# Patient Record
Sex: MALE | Race: WHITE | NOT HISPANIC OR LATINO | Employment: OTHER | URBAN - METROPOLITAN AREA
[De-identification: names, ages, dates, MRNs, and addresses within clinical notes are randomized per-mention and may not be internally consistent; named-entity substitution may affect disease eponyms.]

---

## 2025-01-23 ENCOUNTER — HOSPITAL ENCOUNTER (OUTPATIENT)
Dept: RADIOLOGY | Facility: CLINIC | Age: 75
Discharge: HOME | End: 2025-01-23
Payer: MEDICARE

## 2025-01-23 DIAGNOSIS — M47.817 SPONDYLOSIS WITHOUT MYELOPATHY OR RADICULOPATHY, LUMBOSACRAL REGION: ICD-10-CM

## 2025-01-23 DIAGNOSIS — M46.1 SACROILIITIS, NOT ELSEWHERE CLASSIFIED (CMS-HCC): ICD-10-CM

## 2025-01-23 PROCEDURE — 72170 X-RAY EXAM OF PELVIS: CPT

## 2025-01-23 PROCEDURE — 72114 X-RAY EXAM L-S SPINE BENDING: CPT | Performed by: STUDENT IN AN ORGANIZED HEALTH CARE EDUCATION/TRAINING PROGRAM

## 2025-01-23 PROCEDURE — 72170 X-RAY EXAM OF PELVIS: CPT | Performed by: STUDENT IN AN ORGANIZED HEALTH CARE EDUCATION/TRAINING PROGRAM

## 2025-01-23 PROCEDURE — 72114 X-RAY EXAM L-S SPINE BENDING: CPT

## 2025-07-02 ENCOUNTER — APPOINTMENT (OUTPATIENT)
Dept: NEUROSURGERY | Facility: HOSPITAL | Age: 75
End: 2025-07-02
Payer: MEDICARE

## 2025-07-11 ENCOUNTER — HOSPITAL ENCOUNTER (INPATIENT)
Facility: HOSPITAL | Age: 75
DRG: 029 | End: 2025-07-11
Attending: EMERGENCY MEDICINE | Admitting: NURSE PRACTITIONER
Payer: MEDICARE

## 2025-07-11 ENCOUNTER — CLINICAL SUPPORT (OUTPATIENT)
Dept: EMERGENCY MEDICINE | Facility: HOSPITAL | Age: 75
DRG: 029 | End: 2025-07-11
Payer: MEDICARE

## 2025-07-11 ENCOUNTER — APPOINTMENT (OUTPATIENT)
Dept: RADIOLOGY | Facility: HOSPITAL | Age: 75
DRG: 029 | End: 2025-07-11
Payer: MEDICARE

## 2025-07-11 DIAGNOSIS — T85.192A SPINAL CORD STIMULATOR DYSFUNCTION, INITIAL ENCOUNTER: ICD-10-CM

## 2025-07-11 DIAGNOSIS — T84.498A EXPOSED ORTHOPAEDIC HARDWARE: ICD-10-CM

## 2025-07-11 DIAGNOSIS — C61 PROSTATE CANCER (MULTI): ICD-10-CM

## 2025-07-11 DIAGNOSIS — T81.30XA WOUND DEHISCENCE: ICD-10-CM

## 2025-07-11 DIAGNOSIS — F11.90 OPIOID USE: Primary | ICD-10-CM

## 2025-07-11 DIAGNOSIS — M16.0 BILATERAL PRIMARY OSTEOARTHRITIS OF HIP: ICD-10-CM

## 2025-07-11 PROBLEM — E11.9 TYPE 2 DIABETES MELLITUS WITHOUT COMPLICATION: Status: ACTIVE | Noted: 2024-08-25

## 2025-07-11 PROBLEM — Z96.82 PRESENCE OF NEUROSTIMULATOR: Status: ACTIVE | Noted: 2024-10-02

## 2025-07-11 PROBLEM — Z85.48: Status: ACTIVE | Noted: 2019-01-07

## 2025-07-11 LAB
ABO GROUP (TYPE) IN BLOOD: NORMAL
ANION GAP SERPL CALC-SCNC: 13 MMOL/L (ref 10–20)
ANTIBODY SCREEN: NORMAL
BASOPHILS # BLD AUTO: 0.08 X10*3/UL (ref 0–0.1)
BASOPHILS NFR BLD AUTO: 1 %
BUN SERPL-MCNC: 10 MG/DL (ref 6–23)
CALCIUM SERPL-MCNC: 9.7 MG/DL (ref 8.6–10.6)
CARDIAC TROPONIN I PNL SERPL HS: 7 NG/L (ref 0–53)
CHLORIDE SERPL-SCNC: 102 MMOL/L (ref 98–107)
CO2 SERPL-SCNC: 26 MMOL/L (ref 21–32)
CREAT SERPL-MCNC: 0.94 MG/DL (ref 0.5–1.3)
CRP SERPL-MCNC: 0.14 MG/DL
EGFRCR SERPLBLD CKD-EPI 2021: 85 ML/MIN/1.73M*2
EOSINOPHIL # BLD AUTO: 0.41 X10*3/UL (ref 0–0.4)
EOSINOPHIL NFR BLD AUTO: 5 %
ERYTHROCYTE [DISTWIDTH] IN BLOOD BY AUTOMATED COUNT: 12.2 % (ref 11.5–14.5)
ERYTHROCYTE [SEDIMENTATION RATE] IN BLOOD BY WESTERGREN METHOD: 10 MM/H (ref 0–20)
EST. AVERAGE GLUCOSE BLD GHB EST-MCNC: 183 MG/DL
GLUCOSE BLD MANUAL STRIP-MCNC: 105 MG/DL (ref 74–99)
GLUCOSE SERPL-MCNC: 96 MG/DL (ref 74–99)
HBA1C MFR BLD: 8 % (ref ?–5.7)
HCT VFR BLD AUTO: 43.5 % (ref 41–52)
HGB BLD-MCNC: 14.7 G/DL (ref 13.5–17.5)
IMM GRANULOCYTES # BLD AUTO: 0.03 X10*3/UL (ref 0–0.5)
IMM GRANULOCYTES NFR BLD AUTO: 0.4 % (ref 0–0.9)
INR PPP: 1 (ref 0.9–1.1)
LYMPHOCYTES # BLD AUTO: 1.84 X10*3/UL (ref 0.8–3)
LYMPHOCYTES NFR BLD AUTO: 22.4 %
MCH RBC QN AUTO: 29.5 PG (ref 26–34)
MCHC RBC AUTO-ENTMCNC: 33.8 G/DL (ref 32–36)
MCV RBC AUTO: 87 FL (ref 80–100)
MONOCYTES # BLD AUTO: 0.76 X10*3/UL (ref 0.05–0.8)
MONOCYTES NFR BLD AUTO: 9.3 %
NEUTROPHILS # BLD AUTO: 5.09 X10*3/UL (ref 1.6–5.5)
NEUTROPHILS NFR BLD AUTO: 61.9 %
NRBC BLD-RTO: 0 /100 WBCS (ref 0–0)
PLATELET # BLD AUTO: 214 X10*3/UL (ref 150–450)
POTASSIUM SERPL-SCNC: 4.3 MMOL/L (ref 3.5–5.3)
PROTHROMBIN TIME: 11.5 SECONDS (ref 9.8–12.4)
RBC # BLD AUTO: 4.98 X10*6/UL (ref 4.5–5.9)
RH FACTOR (ANTIGEN D): NORMAL
SODIUM SERPL-SCNC: 137 MMOL/L (ref 136–145)
WBC # BLD AUTO: 8.2 X10*3/UL (ref 4.4–11.3)

## 2025-07-11 PROCEDURE — 85025 COMPLETE CBC W/AUTO DIFF WBC: CPT | Performed by: EMERGENCY MEDICINE

## 2025-07-11 PROCEDURE — 93005 ELECTROCARDIOGRAM TRACING: CPT

## 2025-07-11 PROCEDURE — 36415 COLL VENOUS BLD VENIPUNCTURE: CPT | Performed by: EMERGENCY MEDICINE

## 2025-07-11 PROCEDURE — 86140 C-REACTIVE PROTEIN: CPT | Performed by: EMERGENCY MEDICINE

## 2025-07-11 PROCEDURE — 71045 X-RAY EXAM CHEST 1 VIEW: CPT

## 2025-07-11 PROCEDURE — 99285 EMERGENCY DEPT VISIT HI MDM: CPT | Performed by: EMERGENCY MEDICINE

## 2025-07-11 PROCEDURE — 1210000001 HC SEMI-PRIVATE ROOM DAILY

## 2025-07-11 PROCEDURE — 86900 BLOOD TYPING SEROLOGIC ABO: CPT | Performed by: EMERGENCY MEDICINE

## 2025-07-11 PROCEDURE — 72072 X-RAY EXAM THORAC SPINE 3VWS: CPT | Performed by: RADIOLOGY

## 2025-07-11 PROCEDURE — 0PB40ZZ EXCISION OF THORACIC VERTEBRA, OPEN APPROACH: ICD-10-PCS | Performed by: STUDENT IN AN ORGANIZED HEALTH CARE EDUCATION/TRAINING PROGRAM

## 2025-07-11 PROCEDURE — 83036 HEMOGLOBIN GLYCOSYLATED A1C: CPT | Performed by: NURSE PRACTITIONER

## 2025-07-11 PROCEDURE — 71045 X-RAY EXAM CHEST 1 VIEW: CPT | Performed by: RADIOLOGY

## 2025-07-11 PROCEDURE — 85610 PROTHROMBIN TIME: CPT | Performed by: EMERGENCY MEDICINE

## 2025-07-11 PROCEDURE — 84484 ASSAY OF TROPONIN QUANT: CPT | Performed by: EMERGENCY MEDICINE

## 2025-07-11 PROCEDURE — 72072 X-RAY EXAM THORAC SPINE 3VWS: CPT

## 2025-07-11 PROCEDURE — 82947 ASSAY GLUCOSE BLOOD QUANT: CPT

## 2025-07-11 PROCEDURE — 00PV0MZ REMOVAL OF NEUROSTIMULATOR LEAD FROM SPINAL CORD, OPEN APPROACH: ICD-10-PCS | Performed by: STUDENT IN AN ORGANIZED HEALTH CARE EDUCATION/TRAINING PROGRAM

## 2025-07-11 PROCEDURE — 0JB70ZZ EXCISION OF BACK SUBCUTANEOUS TISSUE AND FASCIA, OPEN APPROACH: ICD-10-PCS | Performed by: STUDENT IN AN ORGANIZED HEALTH CARE EDUCATION/TRAINING PROGRAM

## 2025-07-11 PROCEDURE — 99222 1ST HOSP IP/OBS MODERATE 55: CPT

## 2025-07-11 PROCEDURE — 72170 X-RAY EXAM OF PELVIS: CPT | Performed by: RADIOLOGY

## 2025-07-11 PROCEDURE — 99223 1ST HOSP IP/OBS HIGH 75: CPT | Performed by: NURSE PRACTITIONER

## 2025-07-11 PROCEDURE — 72100 X-RAY EXAM L-S SPINE 2/3 VWS: CPT | Performed by: RADIOLOGY

## 2025-07-11 PROCEDURE — 80048 BASIC METABOLIC PNL TOTAL CA: CPT | Performed by: EMERGENCY MEDICINE

## 2025-07-11 PROCEDURE — 72170 X-RAY EXAM OF PELVIS: CPT

## 2025-07-11 PROCEDURE — 0JPT0MZ REMOVAL OF STIMULATOR GENERATOR FROM TRUNK SUBCUTANEOUS TISSUE AND FASCIA, OPEN APPROACH: ICD-10-PCS | Performed by: STUDENT IN AN ORGANIZED HEALTH CARE EDUCATION/TRAINING PROGRAM

## 2025-07-11 PROCEDURE — 85652 RBC SED RATE AUTOMATED: CPT | Performed by: EMERGENCY MEDICINE

## 2025-07-11 PROCEDURE — 72100 X-RAY EXAM L-S SPINE 2/3 VWS: CPT

## 2025-07-11 RX ORDER — INSULIN GLARGINE 100 [IU]/ML
10 INJECTION, SOLUTION SUBCUTANEOUS NIGHTLY
Status: DISCONTINUED | OUTPATIENT
Start: 2025-07-11 | End: 2025-07-11

## 2025-07-11 RX ORDER — ENOXAPARIN SODIUM 100 MG/ML
40 INJECTION SUBCUTANEOUS EVERY 24 HOURS
Status: DISCONTINUED | OUTPATIENT
Start: 2025-07-12 | End: 2025-07-17 | Stop reason: HOSPADM

## 2025-07-11 RX ORDER — PROMETHAZINE HYDROCHLORIDE 25 MG/1
25 TABLET ORAL EVERY 6 HOURS PRN
COMMUNITY

## 2025-07-11 RX ORDER — PEN NEEDLE, DIABETIC 31 GX5/16"
NEEDLE, DISPOSABLE MISCELLANEOUS
COMMUNITY
Start: 2024-12-23

## 2025-07-11 RX ORDER — HYDROCODONE BITARTRATE AND ACETAMINOPHEN 5; 325 MG/1; MG/1
1 TABLET ORAL EVERY 6 HOURS PRN
Refills: 0 | Status: DISCONTINUED | OUTPATIENT
Start: 2025-07-11 | End: 2025-07-11

## 2025-07-11 RX ORDER — HYDROCODONE BITARTRATE AND ACETAMINOPHEN 5; 325 MG/1; MG/1
1 TABLET ORAL EVERY 6 HOURS PRN
Status: DISCONTINUED | OUTPATIENT
Start: 2025-07-11 | End: 2025-07-11

## 2025-07-11 RX ORDER — INSULIN GLARGINE 100 [IU]/ML
38 INJECTION, SOLUTION SUBCUTANEOUS EVERY MORNING
COMMUNITY
Start: 2025-07-01

## 2025-07-11 RX ORDER — DEXTROSE 50 % IN WATER (D50W) INTRAVENOUS SYRINGE
12.5
Status: DISCONTINUED | OUTPATIENT
Start: 2025-07-11 | End: 2025-07-17 | Stop reason: HOSPADM

## 2025-07-11 RX ORDER — DEXTROSE 50 % IN WATER (D50W) INTRAVENOUS SYRINGE
25
Status: DISCONTINUED | OUTPATIENT
Start: 2025-07-11 | End: 2025-07-17 | Stop reason: HOSPADM

## 2025-07-11 RX ORDER — PROCHLORPERAZINE EDISYLATE 5 MG/ML
5 INJECTION INTRAMUSCULAR; INTRAVENOUS EVERY 6 HOURS PRN
Status: DISCONTINUED | OUTPATIENT
Start: 2025-07-11 | End: 2025-07-17 | Stop reason: HOSPADM

## 2025-07-11 RX ORDER — AMOXICILLIN 250 MG
1 CAPSULE ORAL 2 TIMES DAILY
Status: DISCONTINUED | OUTPATIENT
Start: 2025-07-12 | End: 2025-07-17 | Stop reason: HOSPADM

## 2025-07-11 RX ORDER — INSULIN GLARGINE 100 [IU]/ML
10 INJECTION, SOLUTION SUBCUTANEOUS
Status: DISCONTINUED | OUTPATIENT
Start: 2025-07-12 | End: 2025-07-17 | Stop reason: HOSPADM

## 2025-07-11 RX ORDER — HYDROCODONE BITARTRATE AND ACETAMINOPHEN 5; 325 MG/1; MG/1
1 TABLET ORAL EVERY 8 HOURS PRN
Refills: 0 | Status: DISCONTINUED | OUTPATIENT
Start: 2025-07-11 | End: 2025-07-16

## 2025-07-11 RX ORDER — HYDROCODONE BITARTRATE AND ACETAMINOPHEN 5; 325 MG/1; MG/1
1 TABLET ORAL EVERY 8 HOURS PRN
COMMUNITY
Start: 2025-06-15

## 2025-07-11 RX ORDER — HYDRALAZINE HYDROCHLORIDE 25 MG/1
25 TABLET, FILM COATED ORAL 3 TIMES DAILY PRN
Status: DISCONTINUED | OUTPATIENT
Start: 2025-07-11 | End: 2025-07-17 | Stop reason: HOSPADM

## 2025-07-11 RX ORDER — METFORMIN HYDROCHLORIDE 500 MG/1
1 TABLET, EXTENDED RELEASE ORAL
COMMUNITY
Start: 2025-06-05

## 2025-07-11 RX ORDER — INSULIN LISPRO 100 [IU]/ML
0-10 INJECTION, SOLUTION INTRAVENOUS; SUBCUTANEOUS
Status: DISCONTINUED | OUTPATIENT
Start: 2025-07-12 | End: 2025-07-17 | Stop reason: HOSPADM

## 2025-07-11 RX ORDER — INSULIN GLARGINE 100 [IU]/ML
20 INJECTION, SOLUTION SUBCUTANEOUS NIGHTLY
Status: DISCONTINUED | OUTPATIENT
Start: 2025-07-11 | End: 2025-07-11

## 2025-07-11 RX ORDER — MORPHINE SULFATE 4 MG/ML
2 INJECTION INTRAVENOUS EVERY 4 HOURS PRN
Status: ACTIVE | OUTPATIENT
Start: 2025-07-11 | End: 2025-07-12

## 2025-07-11 RX ORDER — ACETAMINOPHEN 500 MG
5 TABLET ORAL NIGHTLY PRN
Status: DISCONTINUED | OUTPATIENT
Start: 2025-07-11 | End: 2025-07-17 | Stop reason: HOSPADM

## 2025-07-11 ASSESSMENT — LIFESTYLE VARIABLES
HAVE PEOPLE ANNOYED YOU BY CRITICIZING YOUR DRINKING: NO
HAVE YOU EVER FELT YOU SHOULD CUT DOWN ON YOUR DRINKING: NO
EVER HAD A DRINK FIRST THING IN THE MORNING TO STEADY YOUR NERVES TO GET RID OF A HANGOVER: NO
EVER FELT BAD OR GUILTY ABOUT YOUR DRINKING: NO
TOTAL SCORE: 0

## 2025-07-11 ASSESSMENT — ENCOUNTER SYMPTOMS
VOMITING: 0
BACK PAIN: 1
CONFUSION: 0
DIARRHEA: 0
FEVER: 0
COUGH: 0
DIZZINESS: 0
HEADACHES: 1
SHORTNESS OF BREATH: 0
CHILLS: 0
DYSURIA: 0
WEAKNESS: 0
WOUND: 1
DIFFICULTY URINATING: 0
NAUSEA: 1
ABDOMINAL PAIN: 0

## 2025-07-11 ASSESSMENT — PAIN DESCRIPTION - LOCATION: LOCATION: BACK

## 2025-07-11 ASSESSMENT — PAIN DESCRIPTION - PAIN TYPE: TYPE: ACUTE PAIN

## 2025-07-11 ASSESSMENT — COGNITIVE AND FUNCTIONAL STATUS - GENERAL
CLIMB 3 TO 5 STEPS WITH RAILING: A LITTLE
DAILY ACTIVITIY SCORE: 24
MOBILITY SCORE: 23

## 2025-07-11 ASSESSMENT — PAIN SCALES - GENERAL
PAINLEVEL_OUTOF10: 5 - MODERATE PAIN
PAINLEVEL_OUTOF10: 2

## 2025-07-11 ASSESSMENT — PAIN - FUNCTIONAL ASSESSMENT
PAIN_FUNCTIONAL_ASSESSMENT: 0-10
PAIN_FUNCTIONAL_ASSESSMENT: 0-10

## 2025-07-11 NOTE — ED PROVIDER NOTES
"  Emergency Department Provider Note       History of Present Illness     History provided by: Patient  Limitations to History: None  External Records Reviewed with Brief Summary: {ED External Records Reviewed with Brief Summary:27992}    HPI:  Kevon Martinez is a 75 y.o. male with T2DM and history of prostate cancer who presents with a chief complaint of back pain and exposed surgical implant. Patient had a nerve stimulator placed in left hip in , which his wife noticed was exposed 3 days ago. Upon examination, there is a 1\" opening in his posterior left hip with part of the nerve stimulator visible. He is normally followed by doctors at [facility]. Patient denies fever, productive cough, chest pain, abdominal pain, diarrhea, constipation, dysuria.     Physical Exam   Triage vitals:  T 36.8 °C (98.2 °F)  HR 77  BP (!) 179/98  RR 16  O2 97 % None (Room air)    General: Awake, alert, in no acute distress  Eyes: Gaze conjugate.  No scleral icterus or injection  HENT: Normo-cephalic, atraumatic. No stridor  CV: {HR:06226::\"Regular\"} rate, {rhythm:18155::\"regular\"} rhythm. Radial pulses 2+ bilaterally  Resp: Breathing non-labored, speaking in full sentences.  Clear to auscultation bilaterally  GI: Soft, non-distended, non-tender. No rebound or guarding.  MSK/Extremities: No gross bony deformities. Moving all extremities. Endorses back pain on spine palpation.   Skin: Warm. Appropriate color  Neuro: Alert. Oriented. Face symmetric. Speech is fluent.  Gross strength and sensation intact in b/l UE and LEs  Psych: Appropriate mood and affect      Medical Decision Making & ED Course   Medical Decision Makin y.o. male with exposed surgical implant. Ortho/spine was consulted, and plan was made to explant nerve stimulator. Patient was admitted to Medicine prior to OR. Patient was NPO, and pre-op labs and tests ordered.   ----  {Scoring Tools Utilized:66443}    Differential diagnoses considered include " "but are not limited to: ***    Social Determinants of Health which Significantly Impact Care: {Social Determinants of Health which Significantly Impact Care:28344} {The following actions were taken to address these social determinants (Optional):66586}    EKG Independent Interpretation: {EKG Independent Interpretation:01270}    Independent Result Review and Interpretation: {Independent Result Review and Interpretation:01980::\"Relevant laboratory and radiographic results were reviewed and independently interpreted by myself.  As necessary, they are commented on in the ED Course.\"}    Chronic conditions affecting the patient's care: {Chronic conditions affecting the patient's care:67200::\"As documented above in MDM\"}    The patient was discussed with the following consultants/services: {The patient was discussed with the following consultants/services:56466}    Care Considerations: {Care Considerations:08483::\"As documented above in MDM\"}    ED Course:  Diagnoses as of 07/11/25 1903   Wound dehiscence       Disposition   {ED Disposition:81561}  As a result of workup, the patient will require admission to the hospital, followed by surgery by Ortho/spine.  The patient was informed of this.  The patient was given the opportunity to ask questions and I answered them. The patient agreed to be admitted to the hospital.      Procedures   Procedures    {ED Provider Level (Optional):55357}    [facility]  Emergency Medicine                                                    " erosion over spine stimulator inplant as above  No acute issues  Preop labs and EKG/imaging reviewed  Will admit to medicine for preop optimization       MD Tita Ruelas MD  07/22/25 4933       Tita Camara MD  07/22/25 7898

## 2025-07-11 NOTE — H&P
History Of Present Illness  Kevon Martinez is a 75 y.o. male with a PMH of T2DM (last HgA1c 8.0% 7/11/25), OA, IKER, HLD, GERD, prostate cancer, HTN, and sacroiliitis. Pt presented to ED as transfer from OSH for further evaluation of exposed nerve stimulator. Pt reports that his wife noticed that wound has dehisced 4 days prior to ED presentation and that his nerve stimulator was exposed. Per pt there was clear drainage from wound. He denies experiencing fever or chills. Pt c/o nausea and headache from not eating when seen by writer in ED bed 43. Pt reports that he has not ate since 10a on 7/10. Back pain on exam was 9/10. Labs obtained as part of ED workup unremarkable. Pt afebrile on arrival to ED. CT T/L spine and Xray imaging of L spine & pelvis pending completion at time of admit. Ortho spine consulted and plan is for intervention with consulting team. Pt afebrile on arrival to ED. Pt admitted to medicine service due to wound dehiscence at nerve stimulator site.    ED interventions: none     Past Medical History  Medical History[1]    Surgical History  Surgical History[2]     Social History  He has no history on file for tobacco use, alcohol use, and drug use.    Family History  Family History[3]     Allergies  Zolpidem, Adhesive, Penicillins, Atorvastatin, Dapagliflozin, Fenofibrate, Meloxicam, Metformin, and Prednisone    Review of Systems   Constitutional:  Negative for chills and fever.   HENT:  Negative for congestion.    Respiratory:  Negative for cough and shortness of breath.    Cardiovascular:  Negative for chest pain.   Gastrointestinal:  Positive for nausea. Negative for abdominal pain, diarrhea and vomiting.   Genitourinary:  Negative for difficulty urinating and dysuria.   Musculoskeletal:  Positive for back pain.   Skin:  Positive for wound.   Neurological:  Positive for headaches. Negative for dizziness and weakness.   Psychiatric/Behavioral:  Negative for confusion.         Physical  "Exam  Vitals reviewed.   Constitutional:       General: He is not in acute distress.  HENT:      Head: Normocephalic.      Nose: Nose normal.      Mouth/Throat:      Mouth: Mucous membranes are dry.   Eyes:      Extraocular Movements: Extraocular movements intact.   Cardiovascular:      Rate and Rhythm: Normal rate.      Pulses: Normal pulses.      Heart sounds: Normal heart sounds.   Pulmonary:      Effort: Pulmonary effort is normal. No respiratory distress.      Breath sounds: Normal breath sounds.   Abdominal:      General: Bowel sounds are normal.      Palpations: Abdomen is soft.   Musculoskeletal:      Cervical back: Normal range of motion.      Right lower leg: No edema.      Left lower leg: No edema.   Skin:     General: Skin is warm.      Comments: Left lower back wound (drsg dry & intact)   Neurological:      Mental Status: He is alert and oriented to person, place, and time.          Last Recorded Vitals  Blood pressure 177/88, pulse 78, temperature 36.8 °C (98.2 °F), resp. rate 16, height 1.752 m (5' 8.98\"), weight 79.4 kg (175 lb), SpO2 98%.    Relevant Results    Results for orders placed or performed during the hospital encounter of 07/11/25 (from the past 24 hours)   CBC and Auto Differential   Result Value Ref Range    WBC 8.2 4.4 - 11.3 x10*3/uL    nRBC 0.0 0.0 - 0.0 /100 WBCs    RBC 4.98 4.50 - 5.90 x10*6/uL    Hemoglobin 14.7 13.5 - 17.5 g/dL    Hematocrit 43.5 41.0 - 52.0 %    MCV 87 80 - 100 fL    MCH 29.5 26.0 - 34.0 pg    MCHC 33.8 32.0 - 36.0 g/dL    RDW 12.2 11.5 - 14.5 %    Platelets 214 150 - 450 x10*3/uL    Neutrophils % 61.9 40.0 - 80.0 %    Immature Granulocytes %, Automated 0.4 0.0 - 0.9 %    Lymphocytes % 22.4 13.0 - 44.0 %    Monocytes % 9.3 2.0 - 10.0 %    Eosinophils % 5.0 0.0 - 6.0 %    Basophils % 1.0 0.0 - 2.0 %    Neutrophils Absolute 5.09 1.60 - 5.50 x10*3/uL    Immature Granulocytes Absolute, Automated 0.03 0.00 - 0.50 x10*3/uL    Lymphocytes Absolute 1.84 0.80 - 3.00 " x10*3/uL    Monocytes Absolute 0.76 0.05 - 0.80 x10*3/uL    Eosinophils Absolute 0.41 (H) 0.00 - 0.40 x10*3/uL    Basophils Absolute 0.08 0.00 - 0.10 x10*3/uL   Basic metabolic panel   Result Value Ref Range    Glucose 96 74 - 99 mg/dL    Sodium 137 136 - 145 mmol/L    Potassium 4.3 3.5 - 5.3 mmol/L    Chloride 102 98 - 107 mmol/L    Bicarbonate 26 21 - 32 mmol/L    Anion Gap 13 10 - 20 mmol/L    Urea Nitrogen 10 6 - 23 mg/dL    Creatinine 0.94 0.50 - 1.30 mg/dL    eGFR 85 >60 mL/min/1.73m*2    Calcium 9.7 8.6 - 10.6 mg/dL   Protime-INR   Result Value Ref Range    Protime 11.5 9.8 - 12.4 seconds    INR 1.0 0.9 - 1.1   Troponin I, High Sensitivity   Result Value Ref Range    Troponin I, High Sensitivity (CMC) 7 0 - 53 ng/L   Type And Screen   Result Value Ref Range    ABO TYPE A     Rh TYPE POS     ANTIBODY SCREEN NEG    Sedimentation rate, automated   Result Value Ref Range    Sedimentation Rate 10 0 - 20 mm/h   C-reactive protein   Result Value Ref Range    C-Reactive Protein 0.14 <1.00 mg/dL   Hemoglobin A1c   Result Value Ref Range    Hemoglobin A1C 8.0 (H) See comment %    Estimated Average Glucose 183 Not Established mg/dL      Lab Results   Component Value Date    HGBA1C 8.0 (H) 07/11/2025        Scheduled medications  Scheduled Medications[4]  Continuous medications  Continuous Medications[5]  PRN medications  PRN Medications[6]       Assessment and Plan   Mr. Martinez is a 75 year old male with a PMH of T2DM (last HgA1c 8.05 7/11/25), OA, IKER, HLD, GERD, prostate cancer, HTN, and sacroiliitis. Pt presented to ED as transfer from OSH for further evaluation of exposed nerve stimulator. Labs obtained as part of ED workup unremarkable. Pt afebrile on arrival to ED. CT T/L spine and Xray imaging of L spine & pelvis pending completion at time of admit. Ortho spine consulted and plan is for intervention with consulting team. Pt afebrile on arrival to ED. Pt admitted to medicine service due to wound dehiscence at  nerve stimulator site.    Wound dehiscence with exposed nerve stimulator  - ortho spine consulted, appreciate recs  - Pre-op labs/tests ordered: CBC, BMP, PT/INR, T&S, EKG, CXR, CT L spine, CT T spine, Xray L spine, Xray pelvis  - NPO except meds with sips of water  - holding chemo ppx d/t upcoming surgery  - Surgical Risk Assessment: Patient doesn’t have contraindications to elective surgery. Surgery-Specific Cardiac Risk: Low risk (<1% ). Patient-Specific Cardiac Risk:  RCRI 1 points class II risk. This also corresponds to a low-risk of major perioperative cardiovascular events. The procedure, alternative treatments, risks, and benefits were explained to the patient. The risk of MACE in the perioperative period was explained to the patient. Troponins were negative and EKG nonischemic. No evidence of coagulopathy on coagulation panel. This patient is low risk for a low risk procedure and is at this time medically optimized for surgical intervention.   - Pain regimen: c/w home dose of Norco 1 tab q8h PRN moderate pain and Morphine 2 mg q4h PRN severe pain (x24 hrs)    T2DM (last HgA1c 8.0% 7/11/25)  - home regimen: Metformin  mg PO TID and Lantus 38 units every morning  - holding home dose of Metformin will resume on discharge  - Lispro SSI 0-10 units ordered  - Lantus 10 units at every morning (can titrate dose as needed, lower dose ordered to prevent episodes of hypoglycemia)  - accucheck ACHS    HTN  - no meds  - BP on admit 177/88  - continue to monitor BP  - Hydralazine 25 mg PO TID PRN SBP>180/DBP>100    Sacroiliitis  - home regimen: Norco 1 tab PO q8h PRN mod-severe pain. Pt may take additional dose 10 days out of month (last filled via [facility] 6/15/25 100 tabs/30 days)  - Norco 1 tab PO q8h PRN mod pain    Dispo: admit to [facility]. Plan per above. Estimated LOS: 2-3 days    I spent 75 minutes in the professional and overall care of this patient.      Chrissy Lam, APRN-CNP         [1] No past medical  history on file.  [2]   Past Surgical History:  Procedure Laterality Date    OTHER SURGICAL HISTORY  05/17/2022    Ankle fracture repair   [3] No family history on file.  [4] [Held by provider] enoxaparin, 40 mg, subcutaneous, q24h  [START ON 7/12/2025] insulin glargine, 10 Units, subcutaneous, Daily before breakfast  [START ON 7/12/2025] insulin lispro, 0-10 Units, subcutaneous, TID AC  [START ON 7/12/2025] sennosides-docusate sodium, 1 tablet, oral, BID     [5]    [6] PRN medications: dextrose, dextrose, glucagon, glucagon, hydrALAZINE, HYDROcodone-acetaminophen, melatonin, morphine, prochlorperazine

## 2025-07-11 NOTE — ED TRIAGE NOTES
Patient presented to the ed from [facility] with complaints of nerve stimulator complaints,  patient has had a nerve stimulator since 2016 and recently had a battery replacment for it in November.   He says that he has had a small opening in the incision site that now appears that the stimulator is migrating out of the wound site.  He was sent here for evaluation and consutation by neuro and surgery.

## 2025-07-11 NOTE — CONSULTS
Orthopaedic Surgery Consult H&P    HPI:   Orthopaedic Problems/Injuries: Dehiscence of spinal stimulator   Other Injuries: none    75 y.o. male (DM, prostate cancer, s/p nerve stimulator in 2014) transferred from OSH for exposed spinal cord stimulator for 3days. No fevers or chills. Patient denies bowel/bladder incontinence. No dense numbness or weakness.    PMH: per above/EMR  PSH: per above/EMR  SocHx: EMR reviewed  FamHx:  Non-contributory to this patient's acute orthopaedic problem.   Allergies: Reviewed in EMR  Meds: Reviewed in EMR    ROS      - 14 point ROS negative except as above    Physical Exam:  Gen: AOx3, NAD  HEENT: normocephalic atraumatic  Psych: appropriate mood and affect  Resp: nonlabored breathing  Cardiac: Extremities WWP, RRR to peripheral palpation  Neuro: alert and oriented   Skin: Dehiscence of incision on left lateral aspect of lower back without erythema or drainage; Well healed incision along spine    Spine Exam:    C5: SILT   Deltoid 5/5 Left; 5/5 Right  C6: SILT   Wrist Ext: 5/5 Left; 5/5 Right  C7: SILT   Triceps: 5/5 Left; 5/5 Right  C8: SILT   Finger flexion: 5/5 Left; 5/5 Right  T1: SILT    Interossei: 5/5 Left; 5/5 Right    Lezama: Negative    L1: SILT       L2: SILT      Hip flexors 5/5 Left; 5/5 Right  L3: SILT      Knee extension 5/5 Left; 5/5 Right  L4: SILT      Tib Ant. (Dorsiflexion) 5/5 Left; 5/5 Right  L5: SILT      EHL 5/5 Left; 5/5 Right  S1: SILT      Plantarflexion 5/5 Left; 5/5 Right    Babinkski: Intact  No clonus    Imaging:  XR Lumbar spine with Posterior fusion L1-L2 without hardware failure.    Assessment:  Orthopaedic Problems/Injuries: Dehiscence of spinal stimulator     75M (DM, prostate cancer, s/p nerve stimulator in 2014) w exposed spinal cord stimulator for 3d. Denies any fever, chills, B/B issues. 5/5 BLUE and BLE. SILT. Afebrile. WBC 8.2. CRP 0.14. ESR 10. CT T/L spine pending. T spine uprights. Plan for OR with Dr. Fountain for I&D and wound closure,  possible device revision early next week (7/14-15)    Plan:  - Admit to medicine, clearance pending for OR; Appreciate documentation of clearance by primary team  - Please obtain pre-operative labs/studies: T&S, PT/INR, CBC, BMP, CXR, EKG  - CT T/L Spine (ordered)  - Uprights T spine XR (ordered)  - Ok for diet from orthopedic perspective at this time  - WBAT  - No indication for transfusion pre-operatively  - DVT PPx: SCDs, okay for chemoppx per primary    Consult seen and staffed within 30 minutes of notification.    This consult was staffed with attending physician, Dr. Fountain.    Jerrell Edwards MD  PGY-1 Orthopaedic Surgery  On-call Resident  _________________________________________________________    This patient will be followed by the Ortho Spine Team while inpatient. See team members and contacts below:    For urgent matters at any time, or for any needs between 6 PM and 6 AM Monday through Friday, on weekends, or on holidays, please page the Orthopaedic Surgery resident on call at [phone].    Spine:  First Call: Darian Leal, PGY-2  Second Call: Arpita Pool, PGY-4

## 2025-07-12 ENCOUNTER — APPOINTMENT (OUTPATIENT)
Dept: RADIOLOGY | Facility: HOSPITAL | Age: 75
DRG: 029 | End: 2025-07-12
Payer: MEDICARE

## 2025-07-12 LAB
ALBUMIN SERPL BCP-MCNC: 4 G/DL (ref 3.4–5)
ANION GAP SERPL CALC-SCNC: 12 MMOL/L (ref 10–20)
ATRIAL RATE: 62 BPM
BASOPHILS # BLD AUTO: 0.08 X10*3/UL (ref 0–0.1)
BASOPHILS NFR BLD AUTO: 1.1 %
BUN SERPL-MCNC: 12 MG/DL (ref 6–23)
CALCIUM SERPL-MCNC: 9.4 MG/DL (ref 8.6–10.6)
CHLORIDE SERPL-SCNC: 102 MMOL/L (ref 98–107)
CO2 SERPL-SCNC: 27 MMOL/L (ref 21–32)
CREAT SERPL-MCNC: 1.03 MG/DL (ref 0.5–1.3)
EGFRCR SERPLBLD CKD-EPI 2021: 76 ML/MIN/1.73M*2
EOSINOPHIL # BLD AUTO: 0.46 X10*3/UL (ref 0–0.4)
EOSINOPHIL NFR BLD AUTO: 6.4 %
ERYTHROCYTE [DISTWIDTH] IN BLOOD BY AUTOMATED COUNT: 12.3 % (ref 11.5–14.5)
GLUCOSE BLD MANUAL STRIP-MCNC: 134 MG/DL (ref 74–99)
GLUCOSE BLD MANUAL STRIP-MCNC: 155 MG/DL (ref 74–99)
GLUCOSE BLD MANUAL STRIP-MCNC: 162 MG/DL (ref 74–99)
GLUCOSE BLD MANUAL STRIP-MCNC: 174 MG/DL (ref 74–99)
GLUCOSE BLD MANUAL STRIP-MCNC: 224 MG/DL (ref 74–99)
GLUCOSE SERPL-MCNC: 121 MG/DL (ref 74–99)
HCT VFR BLD AUTO: 43.1 % (ref 41–52)
HGB BLD-MCNC: 14 G/DL (ref 13.5–17.5)
IMM GRANULOCYTES # BLD AUTO: 0.02 X10*3/UL (ref 0–0.5)
IMM GRANULOCYTES NFR BLD AUTO: 0.3 % (ref 0–0.9)
INR PPP: 0.9 (ref 0.9–1.1)
LYMPHOCYTES # BLD AUTO: 1.88 X10*3/UL (ref 0.8–3)
LYMPHOCYTES NFR BLD AUTO: 26.2 %
MAGNESIUM SERPL-MCNC: 2.37 MG/DL (ref 1.6–2.4)
MCH RBC QN AUTO: 29.8 PG (ref 26–34)
MCHC RBC AUTO-ENTMCNC: 32.5 G/DL (ref 32–36)
MCV RBC AUTO: 92 FL (ref 80–100)
MONOCYTES # BLD AUTO: 0.88 X10*3/UL (ref 0.05–0.8)
MONOCYTES NFR BLD AUTO: 12.3 %
NEUTROPHILS # BLD AUTO: 3.86 X10*3/UL (ref 1.6–5.5)
NEUTROPHILS NFR BLD AUTO: 53.7 %
NRBC BLD-RTO: 0 /100 WBCS (ref 0–0)
P AXIS: 35 DEGREES
P OFFSET: 188 MS
P ONSET: 133 MS
PHOSPHATE SERPL-MCNC: 4.2 MG/DL (ref 2.5–4.9)
PLATELET # BLD AUTO: 216 X10*3/UL (ref 150–450)
POTASSIUM SERPL-SCNC: 3.9 MMOL/L (ref 3.5–5.3)
PR INTERVAL: 180 MS
PROTHROMBIN TIME: 10.4 SECONDS (ref 9.8–12.4)
Q ONSET: 223 MS
QRS COUNT: 10 BEATS
QRS DURATION: 86 MS
QT INTERVAL: 414 MS
QTC CALCULATION(BAZETT): 420 MS
QTC FREDERICIA: 418 MS
R AXIS: -1 DEGREES
RBC # BLD AUTO: 4.7 X10*6/UL (ref 4.5–5.9)
SODIUM SERPL-SCNC: 137 MMOL/L (ref 136–145)
T AXIS: 32 DEGREES
T OFFSET: 430 MS
VENTRICULAR RATE: 62 BPM
WBC # BLD AUTO: 7.2 X10*3/UL (ref 4.4–11.3)

## 2025-07-12 PROCEDURE — 36415 COLL VENOUS BLD VENIPUNCTURE: CPT | Performed by: NURSE PRACTITIONER

## 2025-07-12 PROCEDURE — 2500000001 HC RX 250 WO HCPCS SELF ADMINISTERED DRUGS (ALT 637 FOR MEDICARE OP): Performed by: NURSE PRACTITIONER

## 2025-07-12 PROCEDURE — 84100 ASSAY OF PHOSPHORUS: CPT | Performed by: NURSE PRACTITIONER

## 2025-07-12 PROCEDURE — 72131 CT LUMBAR SPINE W/O DYE: CPT | Performed by: RADIOLOGY

## 2025-07-12 PROCEDURE — 72131 CT LUMBAR SPINE W/O DYE: CPT

## 2025-07-12 PROCEDURE — 1210000001 HC SEMI-PRIVATE ROOM DAILY

## 2025-07-12 PROCEDURE — 99233 SBSQ HOSP IP/OBS HIGH 50: CPT | Performed by: INTERNAL MEDICINE

## 2025-07-12 PROCEDURE — 85610 PROTHROMBIN TIME: CPT | Performed by: NURSE PRACTITIONER

## 2025-07-12 PROCEDURE — 2500000004 HC RX 250 GENERAL PHARMACY W/ HCPCS (ALT 636 FOR OP/ED): Performed by: NURSE PRACTITIONER

## 2025-07-12 PROCEDURE — 83735 ASSAY OF MAGNESIUM: CPT | Performed by: NURSE PRACTITIONER

## 2025-07-12 PROCEDURE — 85025 COMPLETE CBC W/AUTO DIFF WBC: CPT | Performed by: NURSE PRACTITIONER

## 2025-07-12 PROCEDURE — 2500000002 HC RX 250 W HCPCS SELF ADMINISTERED DRUGS (ALT 637 FOR MEDICARE OP, ALT 636 FOR OP/ED): Performed by: NURSE PRACTITIONER

## 2025-07-12 PROCEDURE — 82947 ASSAY GLUCOSE BLOOD QUANT: CPT

## 2025-07-12 PROCEDURE — 72128 CT CHEST SPINE W/O DYE: CPT

## 2025-07-12 PROCEDURE — 72128 CT CHEST SPINE W/O DYE: CPT | Performed by: RADIOLOGY

## 2025-07-12 RX ADMIN — SENNOSIDES AND DOCUSATE SODIUM 1 TABLET: 8.6; 5 TABLET ORAL at 10:50

## 2025-07-12 RX ADMIN — Medication 5 MG: at 01:04

## 2025-07-12 RX ADMIN — INSULIN LISPRO 2 UNITS: 100 INJECTION, SOLUTION INTRAVENOUS; SUBCUTANEOUS at 16:58

## 2025-07-12 RX ADMIN — HYDROCODONE BITARTRATE AND ACETAMINOPHEN 1 TABLET: 5; 325 TABLET ORAL at 01:04

## 2025-07-12 RX ADMIN — SENNOSIDES AND DOCUSATE SODIUM 1 TABLET: 8.6; 5 TABLET ORAL at 20:37

## 2025-07-12 RX ADMIN — HYDROCODONE BITARTRATE AND ACETAMINOPHEN 1 TABLET: 5; 325 TABLET ORAL at 18:57

## 2025-07-12 RX ADMIN — PROCHLORPERAZINE EDISYLATE 5 MG: 5 INJECTION INTRAMUSCULAR; INTRAVENOUS at 01:01

## 2025-07-12 RX ADMIN — INSULIN GLARGINE 10 UNITS: 100 INJECTION, SOLUTION SUBCUTANEOUS at 10:49

## 2025-07-12 RX ADMIN — INSULIN LISPRO 1 UNITS: 100 INJECTION, SOLUTION INTRAVENOUS; SUBCUTANEOUS at 11:55

## 2025-07-12 RX ADMIN — HYDROCODONE BITARTRATE AND ACETAMINOPHEN 1 TABLET: 5; 325 TABLET ORAL at 10:41

## 2025-07-12 SDOH — SOCIAL STABILITY: SOCIAL INSECURITY
WITHIN THE LAST YEAR, HAVE YOU BEEN RAPED OR FORCED TO HAVE ANY KIND OF SEXUAL ACTIVITY BY YOUR PARTNER OR EX-PARTNER?: NO

## 2025-07-12 SDOH — ECONOMIC STABILITY: HOUSING INSECURITY: IN THE PAST 12 MONTHS, HOW MANY TIMES HAVE YOU MOVED WHERE YOU WERE LIVING?: 0

## 2025-07-12 SDOH — ECONOMIC STABILITY: FOOD INSECURITY: WITHIN THE PAST 12 MONTHS, YOU WORRIED THAT YOUR FOOD WOULD RUN OUT BEFORE YOU GOT THE MONEY TO BUY MORE.: NEVER TRUE

## 2025-07-12 SDOH — SOCIAL STABILITY: SOCIAL INSECURITY: WITHIN THE LAST YEAR, HAVE YOU BEEN HUMILIATED OR EMOTIONALLY ABUSED IN OTHER WAYS BY YOUR PARTNER OR EX-PARTNER?: NO

## 2025-07-12 SDOH — SOCIAL STABILITY: SOCIAL INSECURITY
WITHIN THE LAST YEAR, HAVE YOU BEEN KICKED, HIT, SLAPPED, OR OTHERWISE PHYSICALLY HURT BY YOUR PARTNER OR EX-PARTNER?: NO

## 2025-07-12 SDOH — SOCIAL STABILITY: SOCIAL INSECURITY: WITHIN THE LAST YEAR, HAVE YOU BEEN AFRAID OF YOUR PARTNER OR EX-PARTNER?: NO

## 2025-07-12 SDOH — ECONOMIC STABILITY: HOUSING INSECURITY: IN THE LAST 12 MONTHS, WAS THERE A TIME WHEN YOU WERE NOT ABLE TO PAY THE MORTGAGE OR RENT ON TIME?: NO

## 2025-07-12 SDOH — HEALTH STABILITY: PHYSICAL HEALTH
HOW OFTEN DO YOU NEED TO HAVE SOMEONE HELP YOU WHEN YOU READ INSTRUCTIONS, PAMPHLETS, OR OTHER WRITTEN MATERIAL FROM YOUR DOCTOR OR PHARMACY?: NEVER

## 2025-07-12 SDOH — ECONOMIC STABILITY: FOOD INSECURITY: WITHIN THE PAST 12 MONTHS, THE FOOD YOU BOUGHT JUST DIDN'T LAST AND YOU DIDN'T HAVE MONEY TO GET MORE.: NEVER TRUE

## 2025-07-12 SDOH — ECONOMIC STABILITY: INCOME INSECURITY: IN THE PAST 12 MONTHS HAS THE ELECTRIC, GAS, OIL, OR WATER COMPANY THREATENED TO SHUT OFF SERVICES IN YOUR HOME?: NO

## 2025-07-12 SDOH — SOCIAL STABILITY: SOCIAL INSECURITY: ARE YOU OR HAVE YOU BEEN THREATENED OR ABUSED PHYSICALLY, EMOTIONALLY, OR SEXUALLY BY ANYONE?: NO

## 2025-07-12 SDOH — HEALTH STABILITY: PHYSICAL HEALTH: ON AVERAGE, HOW MANY MINUTES DO YOU ENGAGE IN EXERCISE AT THIS LEVEL?: 0 MIN

## 2025-07-12 SDOH — ECONOMIC STABILITY: FOOD INSECURITY: HOW HARD IS IT FOR YOU TO PAY FOR THE VERY BASICS LIKE FOOD, HOUSING, MEDICAL CARE, AND HEATING?: NOT VERY HARD

## 2025-07-12 SDOH — ECONOMIC STABILITY: HOUSING INSECURITY: AT ANY TIME IN THE PAST 12 MONTHS, WERE YOU HOMELESS OR LIVING IN A SHELTER (INCLUDING NOW)?: NO

## 2025-07-12 SDOH — HEALTH STABILITY: PHYSICAL HEALTH: ON AVERAGE, HOW MANY DAYS PER WEEK DO YOU ENGAGE IN MODERATE TO STRENUOUS EXERCISE (LIKE A BRISK WALK)?: 0 DAYS

## 2025-07-12 SDOH — SOCIAL STABILITY: SOCIAL INSECURITY: DOES ANYONE TRY TO KEEP YOU FROM HAVING/CONTACTING OTHER FRIENDS OR DOING THINGS OUTSIDE YOUR HOME?: NO

## 2025-07-12 SDOH — SOCIAL STABILITY: SOCIAL INSECURITY: HAS ANYONE EVER THREATENED TO HURT YOUR FAMILY OR YOUR PETS?: NO

## 2025-07-12 SDOH — SOCIAL STABILITY: SOCIAL INSECURITY: WERE YOU ABLE TO COMPLETE ALL THE BEHAVIORAL HEALTH SCREENINGS?: YES

## 2025-07-12 SDOH — SOCIAL STABILITY: SOCIAL INSECURITY: ARE THERE ANY APPARENT SIGNS OF INJURIES/BEHAVIORS THAT COULD BE RELATED TO ABUSE/NEGLECT?: NO

## 2025-07-12 SDOH — SOCIAL STABILITY: SOCIAL INSECURITY: DO YOU FEEL UNSAFE GOING BACK TO THE PLACE WHERE YOU ARE LIVING?: NO

## 2025-07-12 SDOH — SOCIAL STABILITY: SOCIAL INSECURITY: ABUSE: ADULT

## 2025-07-12 SDOH — SOCIAL STABILITY: SOCIAL INSECURITY: HAVE YOU HAD THOUGHTS OF HARMING ANYONE ELSE?: NO

## 2025-07-12 SDOH — SOCIAL STABILITY: SOCIAL INSECURITY: HAVE YOU HAD ANY THOUGHTS OF HARMING ANYONE ELSE?: NO

## 2025-07-12 SDOH — ECONOMIC STABILITY: TRANSPORTATION INSECURITY: IN THE PAST 12 MONTHS, HAS LACK OF TRANSPORTATION KEPT YOU FROM MEDICAL APPOINTMENTS OR FROM GETTING MEDICATIONS?: NO

## 2025-07-12 SDOH — SOCIAL STABILITY: SOCIAL INSECURITY: DO YOU FEEL ANYONE HAS EXPLOITED OR TAKEN ADVANTAGE OF YOU FINANCIALLY OR OF YOUR PERSONAL PROPERTY?: NO

## 2025-07-12 ASSESSMENT — LIFESTYLE VARIABLES
HOW OFTEN DO YOU HAVE 6 OR MORE DRINKS ON ONE OCCASION: NEVER
SKIP TO QUESTIONS 9-10: 1
HOW OFTEN DO YOU HAVE A DRINK CONTAINING ALCOHOL: MONTHLY OR LESS
HOW MANY STANDARD DRINKS CONTAINING ALCOHOL DO YOU HAVE ON A TYPICAL DAY: 1 OR 2
AUDIT-C TOTAL SCORE: 1
AUDIT-C TOTAL SCORE: 1

## 2025-07-12 ASSESSMENT — PAIN SCALES - GENERAL
PAINLEVEL_OUTOF10: 2
PAINLEVEL_OUTOF10: 6
PAINLEVEL_OUTOF10: 6

## 2025-07-12 ASSESSMENT — COGNITIVE AND FUNCTIONAL STATUS - GENERAL
MOBILITY SCORE: 23
DAILY ACTIVITIY SCORE: 24
PATIENT BASELINE BEDBOUND: NO
CLIMB 3 TO 5 STEPS WITH RAILING: A LITTLE

## 2025-07-12 ASSESSMENT — PATIENT HEALTH QUESTIONNAIRE - PHQ9
1. LITTLE INTEREST OR PLEASURE IN DOING THINGS: NOT AT ALL
SUM OF ALL RESPONSES TO PHQ9 QUESTIONS 1 & 2: 0
2. FEELING DOWN, DEPRESSED OR HOPELESS: NOT AT ALL

## 2025-07-12 ASSESSMENT — PAIN - FUNCTIONAL ASSESSMENT
PAIN_FUNCTIONAL_ASSESSMENT: 0-10
PAIN_FUNCTIONAL_ASSESSMENT: WONG-BAKER FACES

## 2025-07-12 ASSESSMENT — ACTIVITIES OF DAILY LIVING (ADL)
WALKS IN HOME: INDEPENDENT
PATIENT'S MEMORY ADEQUATE TO SAFELY COMPLETE DAILY ACTIVITIES?: YES
HEARING - RIGHT EAR: FUNCTIONAL
BATHING: INDEPENDENT
JUDGMENT_ADEQUATE_SAFELY_COMPLETE_DAILY_ACTIVITIES: YES
LACK_OF_TRANSPORTATION: NO
TOILETING: INDEPENDENT
HEARING - LEFT EAR: FUNCTIONAL
FEEDING YOURSELF: INDEPENDENT
LACK_OF_TRANSPORTATION: NO
GROOMING: INDEPENDENT
ADEQUATE_TO_COMPLETE_ADL: YES
DRESSING YOURSELF: INDEPENDENT

## 2025-07-12 ASSESSMENT — PAIN SCALES - WONG BAKER: WONGBAKER_NUMERICALRESPONSE: NO HURT

## 2025-07-12 NOTE — CARE PLAN
Problem: Pain - Adult  Goal: Verbalizes/displays adequate comfort level or baseline comfort level  Outcome: Progressing     Problem: Safety - Adult  Goal: Free from fall injury  Outcome: Progressing     Problem: Discharge Planning  Goal: Discharge to home or other facility with appropriate resources  Outcome: Progressing     Problem: Chronic Conditions and Co-morbidities  Goal: Patient's chronic conditions and co-morbidity symptoms are monitored and maintained or improved  Outcome: Progressing     Problem: Nutrition  Goal: Nutrient intake appropriate for maintaining nutritional needs  Outcome: Progressing   The patient's goals for the shift include      The clinical goals for the shift include  Pt will remain HDS though the shift

## 2025-07-12 NOTE — CARE PLAN
Problem: Pain - Adult  Goal: Verbalizes/displays adequate comfort level or baseline comfort level  Outcome: Progressing     Problem: Safety - Adult  Goal: Free from fall injury  Outcome: Progressing     Problem: Discharge Planning  Goal: Discharge to home or other facility with appropriate resources  Outcome: Progressing     Problem: Chronic Conditions and Co-morbidities  Goal: Patient's chronic conditions and co-morbidity symptoms are monitored and maintained or improved  Outcome: Progressing     Problem: Nutrition  Goal: Nutrient intake appropriate for maintaining nutritional needs  Outcome: Progressing   The patient's goals for the shift include      The clinical goals for the shift include Pt will have decreased pain

## 2025-07-12 NOTE — PROGRESS NOTES
07/12/25 1242   Discharge Planning   Living Arrangements Spouse/significant other   Support Systems Spouse/significant other   Assistance Needed Independent for adls   Type of Residence Private residence   Number of Stairs to Enter Residence 0   Number of Stairs Within Residence 0   Home or Post Acute Services None   Expected Discharge Disposition Home   Does the patient need discharge transport arranged? Yes   [place] coordination needed? Yes   Has discharge transport been arranged? No   Financial Resource Strain   How hard is it for you to pay for the very basics like food, housing, medical care, and heating? Not hard   Housing Stability   In the last 12 months, was there a time when you were not able to pay the mortgage or rent on time? N   At any time in the past 12 months, were you homeless or living in a shelter (including now)? N   Transportation Needs   In the past 12 months, has lack of transportation kept you from medical appointments or from getting medications? no   In the past 12 months, has lack of transportation kept you from meetings, work, or from getting things needed for daily living? No   Stroke Family Assessment   Stroke Family Assessment Needed No   Intensity of Service   Intensity of Service 0-30 min     Transitional Care Coordination Progress Note:  Patient discussed during interdisciplinary rounds.   Team members present: MD, TCC  Plan per Medical/Surgical team: Wound dehiscence  Payor: Medicare  Discharge disposition: pending  Potential Barriers: none  ADOD: 4-5 days    Previous Home Care: NA  DME: cane, glucometer/testing supplies  Pharmacy: [facility]  Falls: Denies  PCP: Dr Tao Saba; last visit 3-4 weeks ago  Met with patient at bedside, provided introduction of self and role. Patient states he lives at home with wife. Patient states he is normally independent for adls; safe at home. Patient states he normally drives self to appointments. Patient states no  concerns obtaining/affording medications; states no social/financial concerns. Patient states family to provide transport home at time of discharge. Per MD, plan for OR with Ortho early next week. Will continue to follow for discharge planning needs.     Caron DUNLAP, RN  Transitional Care Coordinator (TCC)  [phone]

## 2025-07-12 NOTE — PROGRESS NOTES
Kevon Martinez is a 75 y.o. male on day 1 of admission presenting with Wound dehiscence.    Medical History[1]  Surgical History[2]  Social History     Socioeconomic History    Marital status:      Spouse name: Not on file    Number of children: Not on file    Years of education: Not on file    Highest education level: Not on file   Occupational History    Not on file   Tobacco Use    Smoking status: Never     Passive exposure: Never    Smokeless tobacco: Never   Substance and Sexual Activity    Alcohol use: Yes     Alcohol/week: 1.0 standard drink of alcohol     Types: 1 Shots of liquor per week    Drug use: Never    Sexual activity: Not on file   Other Topics Concern    Not on file   Social History Narrative    Not on file     Social Drivers of Health     Financial Resource Strain: Low Risk  (7/12/2025)    Overall Financial Resource Strain (CARDIA)     Difficulty of Paying Living Expenses: Not very hard   Food Insecurity: No Food Insecurity (7/12/2025)    Hunger Vital Sign     Worried About Running Out of Food in the Last Year: Never true     Ran Out of Food in the Last Year: Never true   Transportation Needs: No Transportation Needs (7/12/2025)    PRAPARE - Transportation     Lack of Transportation (Medical): No     Lack of Transportation (Non-Medical): No   Physical Activity: Inactive (7/12/2025)    Exercise Vital Sign     Days of Exercise per Week: 0 days     Minutes of Exercise per Session: 0 min   Stress: Not on file   Social Connections: Not on file   Intimate Partner Violence: Not At Risk (7/12/2025)    Humiliation, Afraid, Rape, and Kick questionnaire     Fear of Current or Ex-Partner: No     Emotionally Abused: No     Physically Abused: No     Sexually Abused: No   Housing Stability: Low Risk  (7/12/2025)    Housing Stability Vital Sign     Unable to Pay for Housing in the Last Year: No     Number of Times Moved in the Last Year: 0     Homeless in the Last Year: No     Allergies[3]    Dietary  Orders (From admission, onward)               May Participate in Room Service  Once        Question:  .  Answer:  Yes        Adult diet Consistent Carb; CCD 75 gm/meal  Diet effective now        Question Answer Comment   Diet type Consistent Carb    Carb diet selection: CCD 75 gm/meal                          Objective     Vitals  Temp:  [36.4 °C (97.5 °F)-36.8 °C (98.2 °F)] 36.4 °C (97.5 °F)  Heart Rate:  [59-78] 59  Resp:  [16-20] 20  BP: (128-179)/(75-98) 128/75       0-10 (Numeric) Pain Score: 2         Peripheral IV 07/11/25 20 G Right Forearm (Active)   Number of days: 1       Relevant Results  Scheduled medications  Scheduled Medications[4]  Continuous medications  Continuous Medications[5]  PRN medications  PRN Medications[6]      S/  Seen and examined  No new complaints no new event over the night   ROS: Negative    O/   General Appearance: Alert, Oriented X3, Cooperative, Not in Acute Distress  HEENT: no eye redness, not pale, no jaundice, Throat: not congested, no ulcers    Chest: Good AE bilateral, No crackles, no ronchies, no wheezes.   Heart: RHR, Normal heart sounds S1, S2, no murmur or gallop,   Abdomen: Soft, lax, no hepatosplenomegaly, intact hernia orifices, negative quiros sign, no tenderness,   Genitalia: no abnormal discharge, no ulcers, no swelling.  Lymphatics: no lymphadenopathy, supraclavicular, inguinal trochlear, or axilla.   Neurology: Intact cranial nerves 2 to 12, intact sensation, intact motor function (Power, tone and reflexes). Normal DTR reflexes.   Extremities: no signs of PAD, no swelling no ulcers   Back: no deformity, no SI tenderness, no ulcers.   Skin: no signs of dehydration, no Rash, Bruises, or purpura.      AS:  Mr. Martinez is a 75 year old male with a PMH of T2DM (last HgA1c 8.05 7/11/25), OA, IKER, HLD, GERD, prostate cancer, HTN, and sacroiliitis. Pt presented to ED as transfer from OSH for further evaluation of exposed nerve stimulator. [facility] consulted pending  further recommendation and possible OR. Pending CT scan.  Off ABX at this time no signs of infection, no signs of sepsis, denied fever.     #. Wound dehiscence with exposed nerve stimulator   - exposed wound  - Labs and images no signs of active infection   - ortho spine on board   - Pain regimen: c/w home dose of Norco 1 tab q8h PRN moderate pain and Morphine 2 mg q4h PRN severe pain (x24 hrs).  - Ortho-spine on board     #. Co-morbidities:  T2DM (last HgA1c 8.0% 7/11/25)  - home regimen: Metformin  mg PO TID and Lantus 38 units every morning  - holding home dose of Metformin will resume on discharge  - Lispro SSI 0-10 units ordered  - Lantus 10 units at every morning (can titrate dose as needed, lower dose ordered to prevent episodes of hypoglycemia)  - accucheck ACHS     HTN  - no home meds  - BP on admit 177/88  - continue to monitor BP  - Hydralazine 25 mg PO TID PRN SBP>180/DBP>100  - after pain control if BP persist to be high will add Amlodipine, ACE as tolerated after surgery     Sacroiliitis  - home regimen: Norco 1 tab PO q8h PRN mod-severe pain. Pt may take additional dose 10 days out of month (last filled via [facility] 6/15/25 100 tabs/30 days)  - Norvo 1 tab PO q8h PRN mod-severe pain    Code Status: Full  DVT ppx:  enxop on hold for surgery   Disp: PT/ot     I reviewed the resident/fellow's documentation and discussed the patient with the resident/fellow. I agree with the resident/fellow's medical decision making as documented in the note.  I saw and evaluated the patient.  I personally obtained the key and critical portions of the history and physical exam or was physically present for key and critical portions performed by the resident. I reviewed the resident's documentation and discussed the patient with the resident.  I agree with the resident's medical decision making as documented in the note.   spoke to the pt, explained the medical and social conditions and the reason for this admission  explained our plan and recommendations.  Time spent on the assessment of patient, gathering and interpreting data, review of medical record/patient history, personally reviewing radiographic imaging. With greater than 50% spent in personal discussion with patient.  Disclaimer:   Portions of this note may have been generated using Dragon voice recognition software. Reasonable efforts were made to correct any dictation errors that resulted due to the programming of this software but some may still be present.   Portions of this note including HPI, ROS, impression/plan, and examination may have been copied forward from previous notes as to provide important historical information essential in contributing to medical decision making. Documentation has been reviewed and edited as necessary to support clinical decision making for today's visit and to reflect my own independent evaluation of this patient.    The time of this note does not reflect the time I saw the patient but the time that this note was written   Time > 45 min         Arpan Villalba MD         [1] History reviewed. No pertinent past medical history.  [2]   Past Surgical History:  Procedure Laterality Date    OTHER SURGICAL HISTORY  05/17/2022    Ankle fracture repair   [3]   Allergies  Allergen Reactions    Zolpidem Unknown    Adhesive Unknown    Penicillins Unknown     Dizzy sweat    Atorvastatin Unknown    Dapagliflozin Unknown    Fenofibrate Unknown    Meloxicam Unknown    Metformin Unknown    Prednisone Unknown     made his eyes hurt   [4] enoxaparin, 40 mg, subcutaneous, q24h  insulin glargine, 10 Units, subcutaneous, Daily before breakfast  insulin lispro, 0-10 Units, subcutaneous, TID AC  sennosides-docusate sodium, 1 tablet, oral, BID  [5]    [6] PRN medications: dextrose, dextrose, glucagon, glucagon, hydrALAZINE, HYDROcodone-acetaminophen, melatonin, morphine, prochlorperazine

## 2025-07-12 NOTE — PROGRESS NOTES
Orthopaedic Spine Surgery Progress Note    Subjective:  No acute events overnight.  Pain controlled on current regimen. Patient denies chest pain, shortness of breath, fever or chills, and numbness or tingling.    Objective:  Vitals:  Vitals:    07/12/25 0538   BP: 128/75   Pulse: 59   Resp:    Temp: 36.4 °C (97.5 °F)   SpO2: 97%       Physical Examination:  - Constitutional: no acute distress, alert, cooperative  - Eyes: anicteric  - Head/Neck: normocephalic, atraumatic  - Respiratory/Thorax: normal work of breathing  - Cardiovascular: extremities warm and well perfused  - Gastrointestinal: non-distended  - Psychological: appropriate mood/behavior  - Skin: warm and dry; additional findings in musculoskeletal evaluation  - Musculoskeletal:    Spine Exam:    L1: SILT       L2: SILT      Hip flexors 5/5 Left; 5/5 Right  L3: SILT      Knee extension 5/5 Left; 5/5 Right  L4: SILT      Tib Ant. (Dorsiflexion) 5/5 Left; 5/5 Right  L5: SILT      EHL 5/5 Left; 5/5 Right  S1: SILT      Plantar flexion 5/5 Left; 5/5 Right    Assessment: Orthopaedic Problems/Injuries: Dehiscence of spinal stimulator      75M (DM, prostate cancer, s/p nerve stimulator in 2014) w exposed spinal cord stimulator for 3d. Denies any fever, chills, B/B issues. 5/5 BLUE and BLE. SILT. Afebrile. WBC 8.2. CRP 0.14. ESR 10. CT T/L spine pending. T spine uprights. Plan for OR with Dr. Fountain for I&D and wound closure, possible device revision early next week (7/14-15)     Plan:  - Admitted to medicine, clearance pending for OR; Appreciate documentation of clearance by primary team  - Please obtain pre-operative labs/studies: T&S, PT/INR, CBC, BMP, CXR, EKG  - Ok for diet from orthopedic perspective at this time  - WBAT  - No indication for transfusion pre-operatively  - DVT PPx: SCDs, okay for chemoppx per primary       Patient was staffed with attending surgeon, Dr. Fountain.    Arpita Pool, DO  Orthopaedic Surgery PGY-4    Orthopaedic Spine  Service  Darian Leal MD (PGY-2)  Arpita Pool DO (PGY-4)  Available by Epic Chat    From 6pm-7am, weekends, holidays, and if no answer and there is an emergent issue, please page orthopaedic consult pager, [phone].

## 2025-07-13 VITALS
SYSTOLIC BLOOD PRESSURE: 138 MMHG | WEIGHT: 175 LBS | RESPIRATION RATE: 18 BRPM | HEART RATE: 74 BPM | HEIGHT: 69 IN | TEMPERATURE: 97.7 F | OXYGEN SATURATION: 94 % | DIASTOLIC BLOOD PRESSURE: 84 MMHG | BODY MASS INDEX: 25.92 KG/M2

## 2025-07-13 LAB
GLUCOSE BLD MANUAL STRIP-MCNC: 161 MG/DL (ref 74–99)
GLUCOSE BLD MANUAL STRIP-MCNC: 275 MG/DL (ref 74–99)
GLUCOSE BLD MANUAL STRIP-MCNC: 89 MG/DL (ref 74–99)

## 2025-07-13 PROCEDURE — 2500000004 HC RX 250 GENERAL PHARMACY W/ HCPCS (ALT 636 FOR OP/ED): Performed by: NURSE PRACTITIONER

## 2025-07-13 PROCEDURE — 2500000001 HC RX 250 WO HCPCS SELF ADMINISTERED DRUGS (ALT 637 FOR MEDICARE OP): Performed by: NURSE PRACTITIONER

## 2025-07-13 PROCEDURE — 99233 SBSQ HOSP IP/OBS HIGH 50: CPT | Performed by: INTERNAL MEDICINE

## 2025-07-13 PROCEDURE — 1210000001 HC SEMI-PRIVATE ROOM DAILY

## 2025-07-13 PROCEDURE — 82947 ASSAY GLUCOSE BLOOD QUANT: CPT

## 2025-07-13 PROCEDURE — 2500000002 HC RX 250 W HCPCS SELF ADMINISTERED DRUGS (ALT 637 FOR MEDICARE OP, ALT 636 FOR OP/ED): Performed by: NURSE PRACTITIONER

## 2025-07-13 PROCEDURE — 2500000004 HC RX 250 GENERAL PHARMACY W/ HCPCS (ALT 636 FOR OP/ED): Performed by: INTERNAL MEDICINE

## 2025-07-13 RX ADMIN — SENNOSIDES AND DOCUSATE SODIUM 1 TABLET: 8.6; 5 TABLET ORAL at 09:02

## 2025-07-13 RX ADMIN — INSULIN LISPRO 2 UNITS: 100 INJECTION, SOLUTION INTRAVENOUS; SUBCUTANEOUS at 09:56

## 2025-07-13 RX ADMIN — HYDROCODONE BITARTRATE AND ACETAMINOPHEN 1 TABLET: 5; 325 TABLET ORAL at 04:26

## 2025-07-13 RX ADMIN — Medication 5 MG: at 21:41

## 2025-07-13 RX ADMIN — HYDROCODONE BITARTRATE AND ACETAMINOPHEN 1 TABLET: 5; 325 TABLET ORAL at 13:09

## 2025-07-13 RX ADMIN — INSULIN GLARGINE 10 UNITS: 100 INJECTION, SOLUTION SUBCUTANEOUS at 09:56

## 2025-07-13 RX ADMIN — PROCHLORPERAZINE EDISYLATE 5 MG: 5 INJECTION INTRAMUSCULAR; INTRAVENOUS at 04:24

## 2025-07-13 RX ADMIN — ENOXAPARIN SODIUM 40 MG: 100 INJECTION SUBCUTANEOUS at 09:02

## 2025-07-13 RX ADMIN — HYDROCODONE BITARTRATE AND ACETAMINOPHEN 1 TABLET: 5; 325 TABLET ORAL at 21:41

## 2025-07-13 RX ADMIN — INSULIN LISPRO 6 UNITS: 100 INJECTION, SOLUTION INTRAVENOUS; SUBCUTANEOUS at 11:48

## 2025-07-13 ASSESSMENT — COGNITIVE AND FUNCTIONAL STATUS - GENERAL
MOBILITY SCORE: 24
DAILY ACTIVITIY SCORE: 24

## 2025-07-13 ASSESSMENT — PAIN SCALES - GENERAL: PAINLEVEL_OUTOF10: 6

## 2025-07-13 ASSESSMENT — PAIN - FUNCTIONAL ASSESSMENT: PAIN_FUNCTIONAL_ASSESSMENT: 0-10

## 2025-07-13 NOTE — SIGNIFICANT EVENT
75M (DM, prostate cancer, s/p nerve stimulator in 2014) w exposed spinal cord stimulator for 3d. Denies any fever, chills, B/B issues. 5/5 BLUE and BLE. SILT. Afebrile. WBC 8.2. CRP 0.14. ESR 10. CT T/L spine pending. T spine uprights.     Plan for OR with Dr. Fountain for I&D and wound closure, possible device revision 7/15     Plan:  - Admitted to medicine, clearance pending for OR; Appreciate documentation of clearance by primary team  - Please obtain pre-operative labs/studies: T&S, PT/INR, CBC, BMP, CXR, EKG  - Ok for diet from orthopedic perspective at this time  - NPO at MN on 7/14 for OR 7/15  - MRI T/L spine to include stimulator  - WBAT  - No indication for transfusion pre-operatively  - DVT PPx: SCDs, okay for chemoppx per primary        Patient was staffed with attending surgeon, Dr. Fountain.     Arpita Pool, DO  Orthopaedic Surgery PGY-4

## 2025-07-13 NOTE — CARE PLAN
The patient's goals for the shift include      The clinical goals for the shift include pt will have a decrease in pain      Problem: Pain - Adult  Goal: Verbalizes/displays adequate comfort level or baseline comfort level  Outcome: Progressing     Problem: Safety - Adult  Goal: Free from fall injury  Outcome: Progressing     Problem: Discharge Planning  Goal: Discharge to home or other facility with appropriate resources  Outcome: Progressing     Problem: Nutrition  Goal: Nutrient intake appropriate for maintaining nutritional needs  Outcome: Progressing

## 2025-07-13 NOTE — PROGRESS NOTES
Kevon Martinez is a 75 y.o. male on day 2 of admission presenting with Wound dehiscence.    Medical History[1]  Surgical History[2]  Social History     Socioeconomic History    Marital status:      Spouse name: Not on file    Number of children: Not on file    Years of education: Not on file    Highest education level: Not on file   Occupational History    Not on file   Tobacco Use    Smoking status: Never     Passive exposure: Never    Smokeless tobacco: Never   Substance and Sexual Activity    Alcohol use: Yes     Alcohol/week: 1.0 standard drink of alcohol     Types: 1 Shots of liquor per week    Drug use: Never    Sexual activity: Not on file   Other Topics Concern    Not on file   Social History Narrative    Not on file     Social Drivers of Health     Financial Resource Strain: Low Risk  (7/12/2025)    Overall Financial Resource Strain (CARDIA)     Difficulty of Paying Living Expenses: Not hard at all   Food Insecurity: No Food Insecurity (7/12/2025)    Hunger Vital Sign     Worried About Running Out of Food in the Last Year: Never true     Ran Out of Food in the Last Year: Never true   Transportation Needs: No Transportation Needs (7/12/2025)    PRAPARE - Transportation     Lack of Transportation (Medical): No     Lack of Transportation (Non-Medical): No   Physical Activity: Inactive (7/12/2025)    Exercise Vital Sign     Days of Exercise per Week: 0 days     Minutes of Exercise per Session: 0 min   Stress: Not on file   Social Connections: Not on file   Intimate Partner Violence: Not At Risk (7/12/2025)    Humiliation, Afraid, Rape, and Kick questionnaire     Fear of Current or Ex-Partner: No     Emotionally Abused: No     Physically Abused: No     Sexually Abused: No   Housing Stability: Low Risk  (7/12/2025)    Housing Stability Vital Sign     Unable to Pay for Housing in the Last Year: No     Number of Times Moved in the Last Year: 0     Homeless in the Last Year: No      Allergies[3]    Dietary Orders (From admission, onward)               NPO Diet Except: Other (specify); Additional Details: Clear liquids until 0500. May have clears up to 2 hours prior to procedure if exact time is known.; Effective midnight  Diet effective midnight        Question Answer Comment   Except: Other (specify)    Additional Details Clear liquids until 0500. May have clears up to 2 hours prior to procedure if exact time is known.            May Participate in Room Service  Once        Question:  .  Answer:  Yes        Adult diet Consistent Carb; CCD 75 gm/meal  Diet effective now        Question Answer Comment   Diet type Consistent Carb    Carb diet selection: CCD 75 gm/meal                          Objective     Vitals  Temp:  [36.3 °C (97.3 °F)-36.4 °C (97.5 °F)] 36.3 °C (97.3 °F)  Heart Rate:  [60-72] 60  Resp:  [16-17] 17  BP: (134-152)/(76-81) 134/76       0-10 (Numeric) Pain Score: 2  [place] FACES Pain Rating: No hurt         Peripheral IV 07/11/25 20 G Right Forearm (Active)   Number of days: 1       Relevant Results  Scheduled medications  Scheduled Medications[4]  Continuous medications  Continuous Medications[5]  PRN medications  PRN Medications[6]      S/  Seen and examined  No new complaints no new event over the night   ROS: Negative    O/   General Appearance: Alert, Oriented X3, Cooperative, Not in Acute Distress  HEENT: no eye redness, not pale, no jaundice, Throat: not congested, no ulcers    Chest: Good AE bilateral, No crackles, no ronchies, no wheezes.   Heart: RHR, Normal heart sounds S1, S2, no murmur or gallop,   Abdomen: Soft, lax, no hepatosplenomegaly, intact hernia orifices, negative quiros sign, no tenderness,   Genitalia: no abnormal discharge, no ulcers, no swelling.  Lymphatics: no lymphadenopathy, supraclavicular, inguinal trochlear, or axilla.   Neurology: Intact cranial nerves 2 to 12, intact sensation, intact motor function (Power, tone and reflexes). Normal  DTR reflexes.   Extremities: no signs of PAD, no swelling no ulcers   Back: no deformity, no SI tenderness, no ulcers.   Skin: no signs of dehydration, no Rash, Bruises, or purpura.      AS:  Mr. Martinez is a 75 year old male with a PMH of T2DM (last HgA1c 8.05 7/11/25), OA, IKER, HLD, GERD, prostate cancer, HTN, and sacroiliitis. Pt presented to ED as transfer from OSH for further evaluation of exposed nerve stimulator. [facility]ospine consulted pending further recommendation and possible OR. Pending CT scan.  Off ABX at this time no signs of infection, no signs of sepsis, denied fever.   Ortho spine rec Pelvic MRI- pending, at this time not planning for surgery this weekend.   NPO after MN    #. Wound dehiscence with exposed nerve stimulator   - exposed wound  - Labs and images no signs of active infection   - ortho spine on board   - Pain regimen: c/w home dose of Norco 1 tab q8h PRN moderate pain and Morphine 2 mg q4h PRN severe pain (x24 hrs).  - Ortho-spine on board rec MRI   -     #. Co-morbidities:  T2DM (last HgA1c 8.0% 7/11/25)  - home regimen: Metformin  mg PO TID and Lantus 38 units every morning  - holding home dose of Metformin will resume on discharge  - Lispro SSI 0-10 units ordered  - Lantus 10 units at every morning (can titrate dose as needed, lower dose ordered to prevent episodes of hypoglycemia)  - accucheck ACHS     HTN  - no home meds  - BP on admit 177/88  - continue to monitor BP  - Hydralazine 25 mg PO TID PRN SBP>180/DBP>100  - after pain control if BP persist to be high will add Amlodipine, ACE as tolerated after surgery     Sacroiliitis  - home regimen: Norco 1 tab PO q8h PRN mod-severe pain. Pt may take additional dose 10 days out of month (last filled via [facility] 6/15/25 100 tabs/30 days)  - Norvo 1 tab PO q8h PRN mod-severe pain    #. Pre-op - eval  PRE OPERATIVE RISK ASSESSMENT  Preoperative cardiovascular risk assessment  Type of surgery: Ortho-spine   Low to Intermediate  Pmhx:    History of stroke: No   Diabetes on insulin: Yes  CKD with creatinine> 2: No  Prior history of MI: No  History of CHF: No  No history of angina, CAD, PAD, valvular heart disease or heart failure  No known history of structural respiratory disease; COPD, ILD.   Cardiac Symptoms   ROS:  1) Chest pain-  Denies  2) Shortness of breath- Denies  3) LL swelling- Denies  4) Orthopnea/PND- Denies  5) Loss of consciousness- Never  6) Exercise capacity- 2 blocks on level ground: Yes  ECG today shows normal sinus rhythm and no acute ischemia.   Impression:  RCRI score of  1 According to this she has  1.1 % a 30 day risk of death, MI or cardiac arrest, during surgery     Code Status: Full  DVT ppx:  enxop on hold for surgery   Disp: PT/ot     I reviewed the resident/fellow's documentation and discussed the patient with the resident/fellow. I agree with the resident/fellow's medical decision making as documented in the note.  I saw and evaluated the patient.  I personally obtained the key and critical portions of the history and physical exam or was physically present for key and critical portions performed by the resident. I reviewed the resident's documentation and discussed the patient with the resident.  I agree with the resident's medical decision making as documented in the note.   spoke to the pt, explained the medical and social conditions and the reason for this admission explained our plan and recommendations.  Time spent on the assessment of patient, gathering and interpreting data, review of medical record/patient history, personally reviewing radiographic imaging. With greater than 50% spent in personal discussion with patient.  Disclaimer:   Portions of this note may have been generated using Dragon voice recognition software. Reasonable efforts were made to correct any dictation errors that resulted due to the programming of this software but some may still be present.   Portions of this note including HPI, ROS,  impression/plan, and examination may have been copied forward from previous notes as to provide important historical information essential in contributing to medical decision making. Documentation has been reviewed and edited as necessary to support clinical decision making for today's visit and to reflect my own independent evaluation of this patient.    The time of this note does not reflect the time I saw the patient but the time that this note was written   Time > 50 min         Arpan Villalba MD           [1] History reviewed. No pertinent past medical history.  [2]   Past Surgical History:  Procedure Laterality Date    OTHER SURGICAL HISTORY  05/17/2022    Ankle fracture repair   [3]   Allergies  Allergen Reactions    Zolpidem Unknown    Adhesive Unknown    Penicillins Unknown     Dizzy sweat    Atorvastatin Unknown    Dapagliflozin Unknown    Fenofibrate Unknown    Meloxicam Unknown    Metformin Unknown    Prednisone Unknown     made his eyes hurt   [4] enoxaparin, 40 mg, subcutaneous, q24h  insulin glargine, 10 Units, subcutaneous, Daily before breakfast  insulin lispro, 0-10 Units, subcutaneous, TID AC  sennosides-docusate sodium, 1 tablet, oral, BID     [5]    [6] PRN medications: dextrose, dextrose, glucagon, glucagon, hydrALAZINE, HYDROcodone-acetaminophen, melatonin, prochlorperazine

## 2025-07-14 ENCOUNTER — ANESTHESIA EVENT (OUTPATIENT)
Dept: OPERATING ROOM | Facility: HOSPITAL | Age: 75
End: 2025-07-14
Payer: MEDICARE

## 2025-07-14 LAB
ABO GROUP (TYPE) IN BLOOD: NORMAL
ANTIBODY SCREEN: NORMAL
GLUCOSE BLD MANUAL STRIP-MCNC: 149 MG/DL (ref 74–99)
GLUCOSE BLD MANUAL STRIP-MCNC: 166 MG/DL (ref 74–99)
GLUCOSE BLD MANUAL STRIP-MCNC: 203 MG/DL (ref 74–99)
GLUCOSE BLD MANUAL STRIP-MCNC: 210 MG/DL (ref 74–99)
GLUCOSE BLD MANUAL STRIP-MCNC: 248 MG/DL (ref 74–99)
RH FACTOR (ANTIGEN D): NORMAL

## 2025-07-14 PROCEDURE — 82947 ASSAY GLUCOSE BLOOD QUANT: CPT

## 2025-07-14 PROCEDURE — 2500000004 HC RX 250 GENERAL PHARMACY W/ HCPCS (ALT 636 FOR OP/ED): Performed by: NURSE PRACTITIONER

## 2025-07-14 PROCEDURE — 86850 RBC ANTIBODY SCREEN: CPT

## 2025-07-14 PROCEDURE — 2500000002 HC RX 250 W HCPCS SELF ADMINISTERED DRUGS (ALT 637 FOR MEDICARE OP, ALT 636 FOR OP/ED): Performed by: NURSE PRACTITIONER

## 2025-07-14 PROCEDURE — 2500000004 HC RX 250 GENERAL PHARMACY W/ HCPCS (ALT 636 FOR OP/ED): Performed by: INTERNAL MEDICINE

## 2025-07-14 PROCEDURE — 99233 SBSQ HOSP IP/OBS HIGH 50: CPT | Performed by: INTERNAL MEDICINE

## 2025-07-14 PROCEDURE — 1210000001 HC SEMI-PRIVATE ROOM DAILY

## 2025-07-14 PROCEDURE — 36415 COLL VENOUS BLD VENIPUNCTURE: CPT

## 2025-07-14 PROCEDURE — 2500000001 HC RX 250 WO HCPCS SELF ADMINISTERED DRUGS (ALT 637 FOR MEDICARE OP): Performed by: NURSE PRACTITIONER

## 2025-07-14 RX ADMIN — INSULIN LISPRO 2 UNITS: 100 INJECTION, SOLUTION INTRAVENOUS; SUBCUTANEOUS at 08:25

## 2025-07-14 RX ADMIN — PROCHLORPERAZINE EDISYLATE 5 MG: 5 INJECTION INTRAMUSCULAR; INTRAVENOUS at 22:19

## 2025-07-14 RX ADMIN — INSULIN GLARGINE 10 UNITS: 100 INJECTION, SOLUTION SUBCUTANEOUS at 08:25

## 2025-07-14 RX ADMIN — ENOXAPARIN SODIUM 40 MG: 100 INJECTION SUBCUTANEOUS at 09:24

## 2025-07-14 RX ADMIN — INSULIN LISPRO 4 UNITS: 100 INJECTION, SOLUTION INTRAVENOUS; SUBCUTANEOUS at 17:23

## 2025-07-14 RX ADMIN — HYDROCODONE BITARTRATE AND ACETAMINOPHEN 1 TABLET: 5; 325 TABLET ORAL at 23:40

## 2025-07-14 RX ADMIN — HYDROCODONE BITARTRATE AND ACETAMINOPHEN 1 TABLET: 5; 325 TABLET ORAL at 14:37

## 2025-07-14 RX ADMIN — INSULIN LISPRO 4 UNITS: 100 INJECTION, SOLUTION INTRAVENOUS; SUBCUTANEOUS at 11:34

## 2025-07-14 RX ADMIN — PROCHLORPERAZINE EDISYLATE 5 MG: 5 INJECTION INTRAMUSCULAR; INTRAVENOUS at 06:44

## 2025-07-14 RX ADMIN — HYDROCODONE BITARTRATE AND ACETAMINOPHEN 1 TABLET: 5; 325 TABLET ORAL at 06:44

## 2025-07-14 RX ADMIN — SENNOSIDES AND DOCUSATE SODIUM 1 TABLET: 8.6; 5 TABLET ORAL at 09:25

## 2025-07-14 ASSESSMENT — COGNITIVE AND FUNCTIONAL STATUS - GENERAL
MOBILITY SCORE: 24
DAILY ACTIVITIY SCORE: 24
MOBILITY SCORE: 24
DAILY ACTIVITIY SCORE: 24
MOBILITY SCORE: 24
DAILY ACTIVITIY SCORE: 24

## 2025-07-14 ASSESSMENT — PAIN SCALES - GENERAL
PAINLEVEL_OUTOF10: 6
PAINLEVEL_OUTOF10: 7
PAINLEVEL_OUTOF10: 6
PAINLEVEL_OUTOF10: 6
PAINLEVEL_OUTOF10: 0 - NO PAIN
PAINLEVEL_OUTOF10: 2

## 2025-07-14 ASSESSMENT — PAIN - FUNCTIONAL ASSESSMENT
PAIN_FUNCTIONAL_ASSESSMENT: 0-10

## 2025-07-14 ASSESSMENT — PAIN DESCRIPTION - ORIENTATION: ORIENTATION: RIGHT

## 2025-07-14 ASSESSMENT — PAIN DESCRIPTION - LOCATION: LOCATION: HIP

## 2025-07-14 NOTE — PROGRESS NOTES
"Orthopaedic Surgery Progress Note    Subjective:  No acute events overnight. Pain well controlled. Denies chest pain, shortness of breath, or fevers. Planning for OR tomorrow. Per MRI cannot complete due to exposed hardware.     Objective:  /84   Pulse 74   Temp 36.5 °C (97.7 °F)   Resp 18   Ht 1.753 m (5' 9\")   Wt 79.4 kg (175 lb)   SpO2 94%   BMI 25.84 kg/m²     Gen: arousable, NAD, appropriately conversational  Cardiac: RRR to peripheral palpation  Resp: nonlabored on RA  GI: soft, nondistended    MSK:  Spine Exam:    L1: SILT       L2: SILT      Hip flexors 5/5 Left; 5/5 Right  L3: SILT      Knee extension 5/5 Left; 5/5 Right  L4: SILT      Tib Ant. (Dorsiflexion) 5/5 Left; 5/5 Right  L5: SILT      EHL 5/5 Left; 5/5 Right  S1: SILT      Plantarflexion 5/5 Left; 5/5 Right    No clonus    Results for orders placed or performed during the hospital encounter of 07/11/25 (from the past 24 hours)   POCT GLUCOSE   Result Value Ref Range    POCT Glucose 161 (H) 74 - 99 mg/dL   POCT GLUCOSE   Result Value Ref Range    POCT Glucose 275 (H) 74 - 99 mg/dL   POCT GLUCOSE   Result Value Ref Range    POCT Glucose 89 74 - 99 mg/dL       CT thoracic spine wo IV contrast   Final Result   Addendum (preliminary) 1 of 1   Interpreted By:  Akua Blank,    ADDENDUM:   Additional images were obtained to include the patient's entire   stimulator device in the left gluteal region. No surrounding   inflammatory changes or fluid collections are seen.        Signed by: Akua Blank 7/12/2025 10:34 AM        -------- ORIGINAL REPORT --------   Dictation workstation:   [id number]      Final   1. Suboptimal evaluation of the spinal stimulator device which is   partially outside the field of view and further limited by beam   hardening artifact. Within this limitation no surrounding   inflammatory changes or fluid collections.   2. Postsurgical changes of instrumented fusion of the L1-L2 with   intervertebral spacer without evidence of " hardware complication.   3. 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        I personally reviewed the images/study and I agree with the findings   as stated by resident physician Danis Posada MD. This study was   interpreted at [place], [place].        MACRO:   None        Signed by: Akua Blank 7/12/2025 9:23 AM   Dictation workstation:   [id number]      CT lumbar spine wo IV contrast   Final Result   Addendum (preliminary) 1 of 1   Interpreted By:  Akua Blank,    ADDENDUM:   Additional images were obtained to include the patient's entire   stimulator device in the left gluteal region. No surrounding   inflammatory changes or fluid collections are seen.        Signed by: Akua Blank 7/12/2025 10:34 AM        -------- ORIGINAL REPORT --------   Dictation workstation:   [id number]      Final   1. Suboptimal evaluation of the spinal stimulator device which is   partially outside the field of view and further limited by beam   hardening artifact. Within this limitation no surrounding   inflammatory changes or fluid collections.   2. Postsurgical changes of instrumented fusion of the L1-L2 with   intervertebral spacer without evidence of hardware complication.   3. 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        I personally reviewed the images/study and I agree with the findings   as stated by resident physician Danis Posada MD. This study was   interpreted at [facility],   [place], [place].        MACRO:   None        Signed by: Akua Blank 7/12/2025 9:23 AM   Dictation workstation:   [id number]      XR thoracic spine 3 views   Final Result   Spinal stimulator leads project over the spinal canal within the   midthoracic spine. Visualized portions appear intact. Otherwise no   acute fracture or traumatic malalignment of the thoracic spine.        I personally reviewed the  images/study and I agree with the findings   as stated by resident physician Danis Posada MD. This study was   interpreted at [facility],   [place], [place].        MACRO:   None        Signed by: Anthony García 7/11/2025 9:48 PM   Dictation workstation:   [id number]      XR chest 1 view   Final Result   1.  No evidence of acute cardiopulmonary process.                  MACRO:   None        Signed by: Buster Neely 7/11/2025 6:43 PM   Dictation workstation:   [id number]      XR pelvis 1-2 views   Final Result   No acute osseous abnormality seen             MACRO:   None        Signed by: Buster Neely 7/11/2025 6:42 PM   Dictation workstation:   [id number]      XR lumbar spine 2-3 views   Final Result   Posterior fusion L1-L2 without hardware failure   Multilevel retrolisthesis in the mid to lower lumbar spine. Severe   facet disease and moderate spondylosis in the lower lumbar spine             MACRO:   None        Signed by: Buster Neely 7/11/2025 6:39 PM   Dictation workstation:   [id number]      MR thoracic spine w and wo IV contrast    (Results Pending)   MR lumbar spine w and wo IV contrast    (Results Pending)       Assessment:  75 y.o. male 75M (DM, prostate cancer, s/p nerve stimulator in 2014) w exposed spinal cord stimulator for 3d. Denies any fever, chills, B/B issues. 5/5 BLUE and BLE. SILT. Afebrile. WBC 8.2. CRP 0.14. ESR 10.     Plan:  - Plan for OR with Dr. Fountain for I&D and wound closure, possible device revision 7/15   - Admitted to medicine, clearance pending for OR; Appreciate documentation of clearance by primary team  - Please obtain pre-operative labs/studies: T&S, PT/INR, CBC, BMP, CXR, EKG  - Ok for diet from orthopedic perspective at this time  - NPO at [facility] on 7/14 for OR 7/15  - MRI T/L spine to include stimulator - cannot perform due to exposed hardware per MRI  - WBAT  - No indication for transfusion pre-operatively  - DVT PPx: SCDs, okay for  chemoppx per primary    Dispo: pending OR    Darian Leal MD  Orthopedic Surgery PGY-2  [facility]  Available by Epic Chat    While inpatient, this patient will be followed by the Orthopedic Spine team. See below for contact information.    This patient will be followed by the Orthopaedic Spine service. Please page or Epic Chat the corresponding residents below with questions or concerns.     Ortho Spine Service (Epic Chat Preferred)    First call: Darian Leal, PGY2  Second call: Jody Pool, PGY4

## 2025-07-14 NOTE — PROGRESS NOTES
Kevon Martinez is a 75 y.o. male on day 3 of admission presenting with Wound dehiscence.    Medical History[1]  Surgical History[2]  Social History     Socioeconomic History    Marital status:      Spouse name: Not on file    Number of children: Not on file    Years of education: Not on file    Highest education level: Not on file   Occupational History    Not on file   Tobacco Use    Smoking status: Never     Passive exposure: Never    Smokeless tobacco: Never   Substance and Sexual Activity    Alcohol use: Yes     Alcohol/week: 1.0 standard drink of alcohol     Types: 1 Shots of liquor per week    Drug use: Never    Sexual activity: Not on file   Other Topics Concern    Not on file   Social History Narrative    Not on file     Social Drivers of Health     Financial Resource Strain: Low Risk  (7/12/2025)    Overall Financial Resource Strain (CARDIA)     Difficulty of Paying Living Expenses: Not hard at all   Food Insecurity: No Food Insecurity (7/12/2025)    Hunger Vital Sign     Worried About Running Out of Food in the Last Year: Never true     Ran Out of Food in the Last Year: Never true   Transportation Needs: No Transportation Needs (7/12/2025)    PRAPARE - Transportation     Lack of Transportation (Medical): No     Lack of Transportation (Non-Medical): No   Physical Activity: Inactive (7/12/2025)    Exercise Vital Sign     Days of Exercise per Week: 0 days     Minutes of Exercise per Session: 0 min   Stress: Not on file   Social Connections: Not on file   Intimate Partner Violence: Not At Risk (7/12/2025)    Humiliation, Afraid, Rape, and Kick questionnaire     Fear of Current or Ex-Partner: No     Emotionally Abused: No     Physically Abused: No     Sexually Abused: No   Housing Stability: Low Risk  (7/12/2025)    Housing Stability Vital Sign     Unable to Pay for Housing in the Last Year: No     Number of Times Moved in the Last Year: 0     Homeless in the Last Year: No      Allergies[3]    Dietary Orders (From admission, onward)               NPO Diet Except: Other (specify); Additional Details: Clear liquids until 0500. May have clears up to 2 hours prior to procedure if exact time is known.; Effective midnight  Diet effective midnight        Question Answer Comment   Except: Other (specify)    Additional Details Clear liquids until 0500. May have clears up to 2 hours prior to procedure if exact time is known.            May Participate in Room Service  Once        Question:  .  Answer:  Yes        Adult diet Consistent Carb; CCD 75 gm/meal  Diet effective now        Question Answer Comment   Diet type Consistent Carb    Carb diet selection: CCD 75 gm/meal                          Objective     Vitals  Temp:  [36.4 °C (97.5 °F)-36.5 °C (97.7 °F)] 36.4 °C (97.5 °F)  Heart Rate:  [59-80] 59  Resp:  [16-18] 18  BP: (130-158)/(76-94) 130/76       0-10 (Numeric) Pain Score: 6         Peripheral IV 07/11/25 20 G Right Forearm (Active)   Number of days: 1       Relevant Results  Scheduled medications  Scheduled Medications[4]  Continuous medications  Continuous Medications[5]  PRN medications  PRN Medications[6]      S/  Seen and examined  No new complaints no new event over the night   ROS: Negative    O/   General Appearance: Alert, Oriented X3, Cooperative, Not in Acute Distress  HEENT: no eye redness, not pale, no jaundice, Throat: not congested, no ulcers    Chest: Good AE bilateral, No crackles, no ronchies, no wheezes.   Heart: RHR, Normal heart sounds S1, S2, no murmur or gallop,   Abdomen: Soft, lax, no hepatosplenomegaly, intact hernia orifices, negative quiros sign, no tenderness,   Genitalia: no abnormal discharge, no ulcers, no swelling.  Lymphatics: no lymphadenopathy, supraclavicular, inguinal trochlear, or axilla.   Neurology: Intact cranial nerves 2 to 12, intact sensation, intact motor function (Power, tone and reflexes). Normal DTR reflexes.   Extremities: no signs of  PAD, no swelling no ulcers   Back: no deformity, no SI tenderness, no ulcers.   Skin: no signs of dehydration, no Rash, Bruises, or purpura.      AS:  Mr. Martinez is a 75 year old male with a PMH of T2DM (last HgA1c 8.05 7/11/25), OA, IKER, HLD, GERD, prostate cancer, HTN, and sacroiliitis. Pt presented to ED as transfer from OSH for further evaluation of exposed nerve stimulator. Orthospine consulted pending further recommendation and possible OR.   Off ABX at this time no signs of infection, no signs of sepsis, denied fever.   Ortho spine rec Pelvic MRI- Ortho planning for OR on 7/15.     #. Wound dehiscence with exposed nerve stimulator   - exposed wound  - Labs and images no signs of active infection   - ortho spine on board   - Pain regimen: c/w home dose of Norco 1 tab q8h PRN moderate pain and Morphine 2 mg q4h PRN severe pain (x24 hrs).  - Ortho-spine on board rec MRI   -     #. Co-morbidities:  T2DM (last HgA1c 8.0% 7/11/25)  - home regimen: Metformin  mg PO TID and Lantus 38 units every morning  - holding home dose of Metformin will resume on discharge  - Lispro SSI 0-10 units ordered  - Lantus 10 units at every morning (can titrate dose as needed, lower dose ordered to prevent episodes of hypoglycemia)  - accucheck ACHS     HTN  - no home meds  - BP on admit 177/88  - continue to monitor BP  - Hydralazine 25 mg PO TID PRN SBP>180/DBP>100  - after pain control if BP persist to be high will add Amlodipine, ACE as tolerated after surgery     Sacroiliitis  - home regimen: Norco 1 tab PO q8h PRN mod-severe pain. Pt may take additional dose 10 days out of month (last filled via [facility] 6/15/25 100 tabs/30 days)  - Norvo 1 tab PO q8h PRN mod-severe pain    #. Pre-op - eval  PRE OPERATIVE RISK ASSESSMENT  Preoperative cardiovascular risk assessment  Type of surgery: Ortho-spine   Low to Intermediate  Pmhx:   History of stroke: No   Diabetes on insulin: Yes  CKD with creatinine> 2: No  Prior history of MI:  No  History of CHF: No  No history of angina, CAD, PAD, valvular heart disease or heart failure  No known history of structural respiratory disease; COPD, ILD.   Cardiac Symptoms   ROS:  1) Chest pain-  Denies  2) Shortness of breath- Denies  3) LL swelling- Denies  4) Orthopnea/PND- Denies  5) Loss of consciousness- Never  6) Exercise capacity- 2 blocks on level ground: Yes  ECG today shows normal sinus rhythm and no acute ischemia.   Impression:  RCRI score of  1 According to this she has  1.1 % a 30 day risk of death, MI or cardiac arrest, during surgery     Code Status: Full  DVT ppx:  enxop on hold for surgery   Disp: PT/ot     I reviewed the resident/fellow's documentation and discussed the patient with the resident/fellow. I agree with the resident/fellow's medical decision making as documented in the note.  I saw and evaluated the patient.  I personally obtained the key and critical portions of the history and physical exam or was physically present for key and critical portions performed by the resident. I reviewed the resident's documentation and discussed the patient with the resident.  I agree with the resident's medical decision making as documented in the note.   spoke to the pt, explained the medical and social conditions and the reason for this admission explained our plan and recommendations.  Time spent on the assessment of patient, gathering and interpreting data, review of medical record/patient history, personally reviewing radiographic imaging. With greater than 50% spent in personal discussion with patient.  Disclaimer:   Portions of this note may have been generated using Dragon voice recognition software. Reasonable efforts were made to correct any dictation errors that resulted due to the programming of this software but some may still be present.   Portions of this note including HPI, ROS, impression/plan, and examination may have been copied forward from previous notes as to provide  important historical information essential in contributing to medical decision making. Documentation has been reviewed and edited as necessary to support clinical decision making for today's visit and to reflect my own independent evaluation of this patient.    The time of this note does not reflect the time I saw the patient but the time that this note was written   Time > 50 min         Arpan Villalba MD             [1] History reviewed. No pertinent past medical history.  [2]   Past Surgical History:  Procedure Laterality Date    OTHER SURGICAL HISTORY  05/17/2022    Ankle fracture repair   [3]   Allergies  Allergen Reactions    Zolpidem Unknown    Adhesive Unknown    Penicillins Unknown     Dizzy sweat    Atorvastatin Unknown    Dapagliflozin Unknown    Fenofibrate Unknown    Meloxicam Unknown    Metformin Unknown    Prednisone Unknown     made his eyes hurt   [4] [Held by provider] enoxaparin, 40 mg, subcutaneous, q24h  insulin glargine, 10 Units, subcutaneous, Daily before breakfast  insulin lispro, 0-10 Units, subcutaneous, TID AC  sennosides-docusate sodium, 1 tablet, oral, BID     [5]    [6] PRN medications: dextrose, dextrose, glucagon, glucagon, hydrALAZINE, HYDROcodone-acetaminophen, melatonin, prochlorperazine

## 2025-07-14 NOTE — CARE PLAN
The clinical goals for the shift include Pt will have decreased pain    Problem: Pain - Adult  Goal: Verbalizes/displays adequate comfort level or baseline comfort level  Outcome: Progressing     Problem: Safety - Adult  Goal: Free from fall injury  Outcome: Progressing

## 2025-07-14 NOTE — NURSING NOTE
1530 assumed care of patient. Patient awake, alert x 3. Bed locked in low position. Call bell within reach.     1900 patient remained safe during shift. Calm and very pleasant.

## 2025-07-14 NOTE — PROGRESS NOTES
Transitional Care Coordination Progress Note:  Patient discussed during interdisciplinary rounds.   Team members present: PHUC JOHNSON  Plan per Medical/Surgical team: Wound dehiscence  Payor: Medicare  Discharge disposition: Pending  Potential Barriers: medical  ADOD: 2-3 days  Plan for OR 7/15 with Ortho for I&D/wound closure and possible device revision. Will continue to follow for discharge planning needs.      Caron DUNLAP, RN  Transitional Care Coordinator (TCC)  [phone]

## 2025-07-14 NOTE — CARE PLAN
The patient's goals for the shift include      The clinical goals for the shift include Pt will remain free from falls/injuries and remain HDS throughout this shift      Problem: Pain - Adult  Goal: Verbalizes/displays adequate comfort level or baseline comfort level  Outcome: Progressing     Problem: Safety - Adult  Goal: Free from fall injury  Outcome: Progressing     Problem: Discharge Planning  Goal: Discharge to home or other facility with appropriate resources  Outcome: Progressing     Problem: Chronic Conditions and Co-morbidities  Goal: Patient's chronic conditions and co-morbidity symptoms are monitored and maintained or improved  Outcome: Progressing     Problem: Nutrition  Goal: Nutrient intake appropriate for maintaining nutritional needs  Outcome: Progressing     Problem: Fall/Injury  Goal: Not fall by end of shift  Outcome: Progressing  Goal: Be free from injury by end of the shift  Outcome: Progressing  Goal: Verbalize understanding of personal risk factors for fall in the hospital  Outcome: Progressing  Goal: Verbalize understanding of risk factor reduction measures to prevent injury from fall in the home  Outcome: Progressing  Goal: Use assistive devices by end of the shift  Outcome: Progressing  Goal: Pace activities to prevent fatigue by end of the shift  Outcome: Progressing     Problem: Pain  Goal: Takes deep breaths with improved pain control throughout the shift  Outcome: Progressing  Goal: Turns in bed with improved pain control throughout the shift  Outcome: Progressing  Goal: Walks with improved pain control throughout the shift  Outcome: Progressing  Goal: Performs ADL's with improved pain control throughout shift  Outcome: Progressing  Goal: Participates in PT with improved pain control throughout the shift  Outcome: Progressing  Goal: Free from opioid side effects throughout the shift  Outcome: Progressing  Goal: Free from acute confusion related to pain meds throughout the  shift  Outcome: Progressing     Problem: Respiratory  Goal: Clear secretions with interventions this shift  Outcome: Progressing  Goal: Minimize anxiety/maximize coping throughout shift  Outcome: Progressing  Goal: Minimal/no exertional discomfort or dyspnea this shift  Outcome: Progressing  Goal: No signs of respiratory distress (eg. Use of accessory muscles. Peds grunting)  Outcome: Progressing  Goal: Patent airway maintained this shift  Outcome: Progressing  Goal: Tolerate mechanical ventilation evidenced by VS/agitation level this shift  Outcome: Progressing  Goal: Tolerate pulmonary toileting this shift  Outcome: Progressing  Goal: Verbalize decreased shortness of breath this shift  Outcome: Progressing  Goal: Wean oxygen to maintain O2 saturation per order/standard this shift  Outcome: Progressing  Goal: Increase self care and/or family involvement in next 24 hours  Outcome: Progressing

## 2025-07-14 NOTE — SIGNIFICANT EVENT
Recommend MRI T/L spine to evaluate stimulator exposure depth and evaluate for deep infection.     Patient's stimulator information was placed in media tab.     MRI unable to be obtained per MRI techologists due to exposed stimulator.       Arpita Pool, DO

## 2025-07-15 ENCOUNTER — SURGERY (OUTPATIENT)
Age: 75
End: 2025-07-15
Payer: MEDICARE

## 2025-07-15 ENCOUNTER — APPOINTMENT (OUTPATIENT)
Dept: NEUROLOGY | Facility: HOSPITAL | Age: 75
DRG: 029 | End: 2025-07-15
Payer: MEDICARE

## 2025-07-15 ENCOUNTER — APPOINTMENT (OUTPATIENT)
Dept: RADIOLOGY | Facility: HOSPITAL | Age: 75
DRG: 029 | End: 2025-07-15
Payer: MEDICARE

## 2025-07-15 ENCOUNTER — ANESTHESIA (OUTPATIENT)
Dept: OPERATING ROOM | Facility: HOSPITAL | Age: 75
End: 2025-07-15
Payer: MEDICARE

## 2025-07-15 PROBLEM — G47.33 OSA (OBSTRUCTIVE SLEEP APNEA): Status: ACTIVE | Noted: 2025-07-15

## 2025-07-15 LAB
GLUCOSE BLD MANUAL STRIP-MCNC: 155 MG/DL (ref 74–99)
GLUCOSE BLD MANUAL STRIP-MCNC: 160 MG/DL (ref 74–99)
GLUCOSE BLD MANUAL STRIP-MCNC: 173 MG/DL (ref 74–99)
GLUCOSE BLD MANUAL STRIP-MCNC: 244 MG/DL (ref 74–99)

## 2025-07-15 PROCEDURE — 7100000001 HC RECOVERY ROOM TIME - INITIAL BASE CHARGE: Performed by: STUDENT IN AN ORGANIZED HEALTH CARE EDUCATION/TRAINING PROGRAM

## 2025-07-15 PROCEDURE — 2500000001 HC RX 250 WO HCPCS SELF ADMINISTERED DRUGS (ALT 637 FOR MEDICARE OP): Performed by: STUDENT IN AN ORGANIZED HEALTH CARE EDUCATION/TRAINING PROGRAM

## 2025-07-15 PROCEDURE — 2500000004 HC RX 250 GENERAL PHARMACY W/ HCPCS (ALT 636 FOR OP/ED)

## 2025-07-15 PROCEDURE — 63048 LAM FACETEC &FORAMOT EA ADDL: CPT

## 2025-07-15 PROCEDURE — 2500000004 HC RX 250 GENERAL PHARMACY W/ HCPCS (ALT 636 FOR OP/ED): Mod: TB

## 2025-07-15 PROCEDURE — 99233 SBSQ HOSP IP/OBS HIGH 50: CPT

## 2025-07-15 PROCEDURE — 3700000002 HC GENERAL ANESTHESIA TIME - EACH INCREMENTAL 1 MINUTE: Performed by: STUDENT IN AN ORGANIZED HEALTH CARE EDUCATION/TRAINING PROGRAM

## 2025-07-15 PROCEDURE — 63046 LAM FACETEC & FORAMOT THRC: CPT | Performed by: STUDENT IN AN ORGANIZED HEALTH CARE EDUCATION/TRAINING PROGRAM

## 2025-07-15 PROCEDURE — 2720000007 HC OR 272 NO HCPCS: Performed by: STUDENT IN AN ORGANIZED HEALTH CARE EDUCATION/TRAINING PROGRAM

## 2025-07-15 PROCEDURE — 63048 LAM FACETEC &FORAMOT EA ADDL: CPT | Performed by: STUDENT IN AN ORGANIZED HEALTH CARE EDUCATION/TRAINING PROGRAM

## 2025-07-15 PROCEDURE — 82947 ASSAY GLUCOSE BLOOD QUANT: CPT

## 2025-07-15 PROCEDURE — 2500000004 HC RX 250 GENERAL PHARMACY W/ HCPCS (ALT 636 FOR OP/ED): Mod: TB | Performed by: NURSE ANESTHETIST, CERTIFIED REGISTERED

## 2025-07-15 PROCEDURE — 3700000001 HC GENERAL ANESTHESIA TIME - INITIAL BASE CHARGE: Performed by: STUDENT IN AN ORGANIZED HEALTH CARE EDUCATION/TRAINING PROGRAM

## 2025-07-15 PROCEDURE — 2500000004 HC RX 250 GENERAL PHARMACY W/ HCPCS (ALT 636 FOR OP/ED): Performed by: STUDENT IN AN ORGANIZED HEALTH CARE EDUCATION/TRAINING PROGRAM

## 2025-07-15 PROCEDURE — 2500000001 HC RX 250 WO HCPCS SELF ADMINISTERED DRUGS (ALT 637 FOR MEDICARE OP): Performed by: NURSE PRACTITIONER

## 2025-07-15 PROCEDURE — 11043 DBRDMT MUSC&/FSCA 1ST 20/<: CPT | Performed by: STUDENT IN AN ORGANIZED HEALTH CARE EDUCATION/TRAINING PROGRAM

## 2025-07-15 PROCEDURE — 1210000001 HC SEMI-PRIVATE ROOM DAILY

## 2025-07-15 PROCEDURE — 7100000002 HC RECOVERY ROOM TIME - EACH INCREMENTAL 1 MINUTE: Performed by: STUDENT IN AN ORGANIZED HEALTH CARE EDUCATION/TRAINING PROGRAM

## 2025-07-15 PROCEDURE — 2500000005 HC RX 250 GENERAL PHARMACY W/O HCPCS: Performed by: STUDENT IN AN ORGANIZED HEALTH CARE EDUCATION/TRAINING PROGRAM

## 2025-07-15 PROCEDURE — 88304 TISSUE EXAM BY PATHOLOGIST: CPT | Mod: TC,SUR | Performed by: INTERNAL MEDICINE

## 2025-07-15 PROCEDURE — 3600000008 HC OR TIME - EACH INCREMENTAL 1 MINUTE - PROCEDURE LEVEL THREE: Performed by: STUDENT IN AN ORGANIZED HEALTH CARE EDUCATION/TRAINING PROGRAM

## 2025-07-15 PROCEDURE — 2500000004 HC RX 250 GENERAL PHARMACY W/ HCPCS (ALT 636 FOR OP/ED): Performed by: ANESTHESIOLOGY

## 2025-07-15 PROCEDURE — 95938 SOMATOSENSORY TESTING: CPT

## 2025-07-15 PROCEDURE — 3600000003 HC OR TIME - INITIAL BASE CHARGE - PROCEDURE LEVEL THREE: Performed by: STUDENT IN AN ORGANIZED HEALTH CARE EDUCATION/TRAINING PROGRAM

## 2025-07-15 PROCEDURE — 63688 REV/RMV IMP SP NPG/R DTCH CN: CPT | Performed by: STUDENT IN AN ORGANIZED HEALTH CARE EDUCATION/TRAINING PROGRAM

## 2025-07-15 PROCEDURE — A6213 FOAM DRG >16<=48 SQ IN W/BDR: HCPCS | Performed by: STUDENT IN AN ORGANIZED HEALTH CARE EDUCATION/TRAINING PROGRAM

## 2025-07-15 PROCEDURE — 63046 LAM FACETEC & FORAMOT THRC: CPT

## 2025-07-15 PROCEDURE — 2500000001 HC RX 250 WO HCPCS SELF ADMINISTERED DRUGS (ALT 637 FOR MEDICARE OP)

## 2025-07-15 PROCEDURE — 2500000005 HC RX 250 GENERAL PHARMACY W/O HCPCS

## 2025-07-15 PROCEDURE — 2780000003 HC OR 278 NO HCPCS: Performed by: STUDENT IN AN ORGANIZED HEALTH CARE EDUCATION/TRAINING PROGRAM

## 2025-07-15 RX ORDER — CEFTRIAXONE 1 G/50ML
1 INJECTION, SOLUTION INTRAVENOUS EVERY 24 HOURS
Status: CANCELLED | OUTPATIENT
Start: 2025-07-15

## 2025-07-15 RX ORDER — LIDOCAINE HYDROCHLORIDE 10 MG/ML
0.1 INJECTION, SOLUTION INFILTRATION; PERINEURAL ONCE
Status: DISCONTINUED | OUTPATIENT
Start: 2025-07-15 | End: 2025-07-15 | Stop reason: HOSPADM

## 2025-07-15 RX ORDER — HYDROMORPHONE HYDROCHLORIDE 1 MG/ML
INJECTION, SOLUTION INTRAMUSCULAR; INTRAVENOUS; SUBCUTANEOUS AS NEEDED
Status: DISCONTINUED | OUTPATIENT
Start: 2025-07-15 | End: 2025-07-15

## 2025-07-15 RX ORDER — FENTANYL CITRATE 50 UG/ML
INJECTION, SOLUTION INTRAMUSCULAR; INTRAVENOUS AS NEEDED
Status: DISCONTINUED | OUTPATIENT
Start: 2025-07-15 | End: 2025-07-15

## 2025-07-15 RX ORDER — FENTANYL CITRATE 50 UG/ML
50 INJECTION, SOLUTION INTRAMUSCULAR; INTRAVENOUS EVERY 5 MIN PRN
Status: DISCONTINUED | OUTPATIENT
Start: 2025-07-15 | End: 2025-07-15 | Stop reason: HOSPADM

## 2025-07-15 RX ORDER — OXYCODONE HYDROCHLORIDE 5 MG/1
5 TABLET ORAL EVERY 4 HOURS PRN
Refills: 0 | Status: CANCELLED | OUTPATIENT
Start: 2025-07-15

## 2025-07-15 RX ORDER — ONDANSETRON HYDROCHLORIDE 2 MG/ML
4 INJECTION, SOLUTION INTRAVENOUS EVERY 8 HOURS PRN
Status: DISCONTINUED | OUTPATIENT
Start: 2025-07-15 | End: 2025-07-17 | Stop reason: HOSPADM

## 2025-07-15 RX ORDER — LIDOCAINE HCL/PF 100 MG/5ML
SYRINGE (ML) INTRAVENOUS AS NEEDED
Status: DISCONTINUED | OUTPATIENT
Start: 2025-07-15 | End: 2025-07-15

## 2025-07-15 RX ORDER — PHENYLEPHRINE HCL IN 0.9% NACL 0.4MG/10ML
SYRINGE (ML) INTRAVENOUS AS NEEDED
Status: DISCONTINUED | OUTPATIENT
Start: 2025-07-15 | End: 2025-07-15

## 2025-07-15 RX ORDER — SODIUM CHLORIDE, SODIUM LACTATE, POTASSIUM CHLORIDE, CALCIUM CHLORIDE 600; 310; 30; 20 MG/100ML; MG/100ML; MG/100ML; MG/100ML
INJECTION, SOLUTION INTRAVENOUS CONTINUOUS PRN
Status: DISCONTINUED | OUTPATIENT
Start: 2025-07-15 | End: 2025-07-15

## 2025-07-15 RX ORDER — ACETAMINOPHEN 325 MG/1
975 TABLET ORAL EVERY 8 HOURS
Status: CANCELLED | OUTPATIENT
Start: 2025-07-15

## 2025-07-15 RX ORDER — LABETALOL HYDROCHLORIDE 5 MG/ML
5 INJECTION, SOLUTION INTRAVENOUS ONCE AS NEEDED
Status: DISCONTINUED | OUTPATIENT
Start: 2025-07-15 | End: 2025-07-15 | Stop reason: HOSPADM

## 2025-07-15 RX ORDER — VANCOMYCIN HYDROCHLORIDE 1 G/20ML
INJECTION, POWDER, LYOPHILIZED, FOR SOLUTION INTRAVENOUS DAILY PRN
Status: DISCONTINUED | OUTPATIENT
Start: 2025-07-15 | End: 2025-07-17 | Stop reason: HOSPADM

## 2025-07-15 RX ORDER — ONDANSETRON HYDROCHLORIDE 2 MG/ML
4 INJECTION, SOLUTION INTRAVENOUS ONCE AS NEEDED
Status: COMPLETED | OUTPATIENT
Start: 2025-07-15 | End: 2025-07-15

## 2025-07-15 RX ORDER — VANCOMYCIN HYDROCHLORIDE 1 G/200ML
1000 INJECTION, SOLUTION INTRAVENOUS EVERY 12 HOURS
Status: CANCELLED | OUTPATIENT
Start: 2025-07-15

## 2025-07-15 RX ORDER — OXYCODONE HYDROCHLORIDE 5 MG/1
10 TABLET ORAL EVERY 4 HOURS PRN
Refills: 0 | Status: CANCELLED | OUTPATIENT
Start: 2025-07-15

## 2025-07-15 RX ORDER — VANCOMYCIN HYDROCHLORIDE 1 G/20ML
INJECTION, POWDER, LYOPHILIZED, FOR SOLUTION INTRAVENOUS DAILY PRN
Status: CANCELLED | OUTPATIENT
Start: 2025-07-15

## 2025-07-15 RX ORDER — OXYCODONE HYDROCHLORIDE 5 MG/1
5 TABLET ORAL EVERY 4 HOURS PRN
Status: DISCONTINUED | OUTPATIENT
Start: 2025-07-15 | End: 2025-07-15 | Stop reason: HOSPADM

## 2025-07-15 RX ORDER — PROPOFOL 10 MG/ML
INJECTION, EMULSION INTRAVENOUS AS NEEDED
Status: DISCONTINUED | OUTPATIENT
Start: 2025-07-15 | End: 2025-07-15

## 2025-07-15 RX ORDER — FENTANYL CITRATE 50 UG/ML
25 INJECTION, SOLUTION INTRAMUSCULAR; INTRAVENOUS EVERY 5 MIN PRN
Status: DISCONTINUED | OUTPATIENT
Start: 2025-07-15 | End: 2025-07-15 | Stop reason: HOSPADM

## 2025-07-15 RX ORDER — ROCURONIUM BROMIDE 10 MG/ML
INJECTION, SOLUTION INTRAVENOUS AS NEEDED
Status: DISCONTINUED | OUTPATIENT
Start: 2025-07-15 | End: 2025-07-15

## 2025-07-15 RX ORDER — ONDANSETRON HYDROCHLORIDE 2 MG/ML
INJECTION, SOLUTION INTRAVENOUS AS NEEDED
Status: DISCONTINUED | OUTPATIENT
Start: 2025-07-15 | End: 2025-07-15

## 2025-07-15 RX ORDER — CEFAZOLIN 1 G/1
INJECTION, POWDER, FOR SOLUTION INTRAVENOUS AS NEEDED
Status: DISCONTINUED | OUTPATIENT
Start: 2025-07-15 | End: 2025-07-15

## 2025-07-15 RX ORDER — POLYMYXIN B 500000 [USP'U]/1
INJECTION, POWDER, LYOPHILIZED, FOR SOLUTION INTRAMUSCULAR; INTRATHECAL; INTRAVENOUS; OPHTHALMIC AS NEEDED
Status: DISCONTINUED | OUTPATIENT
Start: 2025-07-15 | End: 2025-07-15 | Stop reason: HOSPADM

## 2025-07-15 RX ORDER — PHENYLEPHRINE 10 MG/250 ML(40 MCG/ML)IN 0.9 % SOD.CHLORIDE INTRAVENOUS
CONTINUOUS PRN
Status: DISCONTINUED | OUTPATIENT
Start: 2025-07-15 | End: 2025-07-15

## 2025-07-15 RX ORDER — GLYCOPYRROLATE 0.2 MG/ML
INJECTION INTRAMUSCULAR; INTRAVENOUS AS NEEDED
Status: DISCONTINUED | OUTPATIENT
Start: 2025-07-15 | End: 2025-07-15

## 2025-07-15 RX ORDER — MEPERIDINE HYDROCHLORIDE 25 MG/ML
12.5 INJECTION INTRAMUSCULAR; INTRAVENOUS; SUBCUTANEOUS EVERY 10 MIN PRN
Status: DISCONTINUED | OUTPATIENT
Start: 2025-07-15 | End: 2025-07-15 | Stop reason: HOSPADM

## 2025-07-15 RX ORDER — SODIUM CHLORIDE 0.9 G/100ML
INJECTION, SOLUTION IRRIGATION AS NEEDED
Status: DISCONTINUED | OUTPATIENT
Start: 2025-07-15 | End: 2025-07-15 | Stop reason: HOSPADM

## 2025-07-15 RX ORDER — VANCOMYCIN HYDROCHLORIDE 1 G/20ML
INJECTION, POWDER, LYOPHILIZED, FOR SOLUTION INTRAVENOUS AS NEEDED
Status: DISCONTINUED | OUTPATIENT
Start: 2025-07-15 | End: 2025-07-15 | Stop reason: HOSPADM

## 2025-07-15 RX ADMIN — ROCURONIUM BROMIDE 20 MG: 10 INJECTION INTRAVENOUS at 15:29

## 2025-07-15 RX ADMIN — SODIUM CHLORIDE 1000 ML: 900 IRRIGANT IRRIGATION at 14:54

## 2025-07-15 RX ADMIN — Medication 120 MCG: at 14:04

## 2025-07-15 RX ADMIN — PROPOFOL 30 MG: 10 INJECTION, EMULSION INTRAVENOUS at 16:16

## 2025-07-15 RX ADMIN — Medication 80 MCG: at 14:57

## 2025-07-15 RX ADMIN — PIPERACILLIN SODIUM AND TAZOBACTAM SODIUM 3.38 G: 3; .375 INJECTION, SOLUTION INTRAVENOUS at 20:20

## 2025-07-15 RX ADMIN — FENTANYL CITRATE 50 MCG: 50 INJECTION, SOLUTION INTRAMUSCULAR; INTRAVENOUS at 14:24

## 2025-07-15 RX ADMIN — LIDOCAINE HYDROCHLORIDE 100 MG: 20 INJECTION INTRAVENOUS at 13:57

## 2025-07-15 RX ADMIN — SODIUM CHLORIDE, SODIUM LACTATE, POTASSIUM CHLORIDE, AND CALCIUM CHLORIDE: .6; .31; .03; .02 INJECTION, SOLUTION INTRAVENOUS at 13:53

## 2025-07-15 RX ADMIN — VANCOMYCIN HYDROCHLORIDE 1750 MG: 5 INJECTION, POWDER, LYOPHILIZED, FOR SOLUTION INTRAVENOUS at 21:32

## 2025-07-15 RX ADMIN — Medication 120 MCG: at 15:06

## 2025-07-15 RX ADMIN — PROPOFOL 50 MCG/KG/MIN: 10 INJECTION, EMULSION INTRAVENOUS at 14:22

## 2025-07-15 RX ADMIN — FENTANYL CITRATE 50 MCG: 50 INJECTION, SOLUTION INTRAMUSCULAR; INTRAVENOUS at 13:56

## 2025-07-15 RX ADMIN — HYDROCODONE BITARTRATE AND ACETAMINOPHEN 1 TABLET: 5; 325 TABLET ORAL at 09:42

## 2025-07-15 RX ADMIN — POLYMYXIN B SULFATE 500000 UNITS: 500000 INJECTION, POWDER, LYOPHILIZED, FOR SOLUTION INTRAMUSCULAR; INTRATHECAL; INTRAVENOUS; OPHTHALMIC at 14:42

## 2025-07-15 RX ADMIN — PROPOFOL 50 MG: 10 INJECTION, EMULSION INTRAVENOUS at 14:23

## 2025-07-15 RX ADMIN — DEXAMETHASONE SODIUM PHOSPHATE 10 MG: 4 INJECTION INTRA-ARTICULAR; INTRALESIONAL; INTRAMUSCULAR; INTRAVENOUS; SOFT TISSUE at 14:09

## 2025-07-15 RX ADMIN — GLYCOPYRROLATE 0.3 MG: 0.2 INJECTION INTRAMUSCULAR; INTRAVENOUS at 14:57

## 2025-07-15 RX ADMIN — SUGAMMADEX 200 MG: 100 INJECTION, SOLUTION INTRAVENOUS at 16:17

## 2025-07-15 RX ADMIN — SODIUM CHLORIDE 1000 ML: 900 IRRIGANT IRRIGATION at 14:43

## 2025-07-15 RX ADMIN — SENNOSIDES AND DOCUSATE SODIUM 1 TABLET: 8.6; 5 TABLET ORAL at 21:33

## 2025-07-15 RX ADMIN — POLYMYXIN B SULFATE 1000000 UNITS: 500000 INJECTION, POWDER, LYOPHILIZED, FOR SOLUTION INTRAMUSCULAR; INTRATHECAL; INTRAVENOUS; OPHTHALMIC at 14:55

## 2025-07-15 RX ADMIN — FENTANYL CITRATE 50 MCG: 50 INJECTION INTRAMUSCULAR; INTRAVENOUS at 16:56

## 2025-07-15 RX ADMIN — ROCURONIUM BROMIDE 50 MG: 10 INJECTION INTRAVENOUS at 13:58

## 2025-07-15 RX ADMIN — HYDROMORPHONE HYDROCHLORIDE 0.3 MG: 1 INJECTION, SOLUTION INTRAMUSCULAR; INTRAVENOUS; SUBCUTANEOUS at 16:21

## 2025-07-15 RX ADMIN — Medication 80 MCG: at 14:40

## 2025-07-15 RX ADMIN — Medication 120 MCG: at 14:42

## 2025-07-15 RX ADMIN — PHENYLEPHRINE-NACL IV SOLUTION 10 MG/250ML-0.9% 0.4 MCG/KG/MIN: 10-0.9/25 SOLUTION at 15:00

## 2025-07-15 RX ADMIN — VANCOMYCIN HYDROCHLORIDE 2 G: 1025 INJECTION, POWDER, FOR SOLUTION INTRAVENOUS; ORAL at 15:54

## 2025-07-15 RX ADMIN — FENTANYL CITRATE 25 MCG: 50 INJECTION INTRAMUSCULAR; INTRAVENOUS at 17:32

## 2025-07-15 RX ADMIN — ONDANSETRON 4 MG: 2 INJECTION INTRAMUSCULAR; INTRAVENOUS at 16:42

## 2025-07-15 RX ADMIN — HYDROCODONE BITARTRATE AND ACETAMINOPHEN 1 TABLET: 5; 325 TABLET ORAL at 21:09

## 2025-07-15 RX ADMIN — CEFAZOLIN 2 G: 1 INJECTION, POWDER, FOR SOLUTION INTRAMUSCULAR; INTRAVENOUS at 14:30

## 2025-07-15 RX ADMIN — PROPOFOL 150 MG: 10 INJECTION, EMULSION INTRAVENOUS at 13:57

## 2025-07-15 RX ADMIN — ONDANSETRON 4 MG: 2 INJECTION INTRAMUSCULAR; INTRAVENOUS at 20:09

## 2025-07-15 RX ADMIN — ONDANSETRON 4 MG: 2 INJECTION INTRAMUSCULAR; INTRAVENOUS at 15:03

## 2025-07-15 ASSESSMENT — PAIN SCALES - GENERAL
PAINLEVEL_OUTOF10: 6
PAINLEVEL_OUTOF10: 8
PAINLEVEL_OUTOF10: 8
PAINLEVEL_OUTOF10: 7
PAINLEVEL_OUTOF10: 0 - NO PAIN
PAINLEVEL_OUTOF10: 8
PAINLEVEL_OUTOF10: 9
PAINLEVEL_OUTOF10: 8

## 2025-07-15 ASSESSMENT — PAIN DESCRIPTION - DESCRIPTORS: DESCRIPTORS: ACHING

## 2025-07-15 ASSESSMENT — PAIN - FUNCTIONAL ASSESSMENT
PAIN_FUNCTIONAL_ASSESSMENT: 0-10

## 2025-07-15 ASSESSMENT — PAIN DESCRIPTION - LOCATION: LOCATION: BACK

## 2025-07-15 ASSESSMENT — PAIN DESCRIPTION - ORIENTATION: ORIENTATION: MID

## 2025-07-15 NOTE — H&P
H&P reviewed. The patient was examined and there are no changes to the H&P. Patient electing to proceed with surgery.     Darian Leal MD  Orthopaedic Surgery PGY-2   [facility]  Epic Message Preferred

## 2025-07-15 NOTE — PROGRESS NOTES
"Orthopaedic Surgery Progress Note    Subjective: Evaluated in immediate postoperative period. Pain well controlled considering recent surgery. Denies chest pain, shortness of breath, or fevers.    Objective:  /86   Pulse 77   Temp 36.6 °C (97.9 °F) (Temporal)   Resp 16   Ht 1.753 m (5' 9\")   Wt 79.4 kg (175 lb)   SpO2 95%   BMI 25.84 kg/m²     Gen: arousable, NAD, appropriately conversational  Cardiac: RRR to peripheral palpation  Resp: nonlabored on RA  GI: soft, nondistended    MSK:  Spine Exam:  Drain in place holding suction, serosanginous output    C5: SILT   Deltoid 5/5 Left; 5/5 Right  C6: SILT   Wrist Ext: 5/5 Left; 5/5 Right  C7: SILT   Triceps: 5/5 Left; 5/5 Right  C8: SILT   Finger flexion: 5/5 Left; 5/5 Right  T1: SILT    Interossei: 5/5 Left; 5/5 Right    Lezama: Negative    L1: SILT       L2: SILT      Hip flexors 5/5 Left; 5/5 Right  L3: SILT      Knee extension 5/5 Left; 5/5 Right  L4: SILT      Tib Ant. (Dorsiflexion) 5/5 Left; 5/5 Right  L5: SILT      EHL 5/5 Left; 5/5 Right  S1: SILT      Plantarflexion 5/5 Left; 5/5 Right    No clonus    Assessment/Plan: 75 y.o. male s/p SCS removal, T6-8 laminectomy on 7/15 with Dr. Fountain.      Plan:  - Weight bearing: Mobilize ad annia  - DVT ppx: SCDs, please hold chemo ppx in setting of recent spine surgery  - Antibiotics: IV vanc and Zosyn while in house. Please dc on PO abx regimen  - Dressing: Mepilex  - PT/OT  - No kwong  - Drain: x1 HV    Dispo: per primary    Darian Leal MD  Orthopaedic Surgery PGY-2  [facility]  Available by Epic Chat    While inpatient, this patient will be followed by the Orthopedic Spine team. See below for contact information.     This patient will be followed by the Orthopaedic Spine service. Please page or Epic Chat the corresponding residents below with questions or concerns.     First call: Darian Leal, PGY2  Second call: Jody Pool, PGY4    "

## 2025-07-15 NOTE — PROGRESS NOTES
Vancomycin Dosing by Pharmacy- INITIAL    Kevon Martinez is a 75 y.o. year old male who Pharmacy has been consulted for vancomycin dosing for other surgical infection. Based on the patient's indication and renal status this patient will be dosed based on a goal AUC of 400-600.     Renal function is currently stable.    Visit Vitals  BP (!) 158/96   Pulse 93   Temp 36.4 °C (97.5 °F)   Resp 17        Lab Results   Component Value Date    CREATININE 1.03 2025    CREATININE 0.94 2025    CREATININE 1.18 2022    CREATININE 0.91 2022    CREATININE 0.92 2021    CREATININE 0.97 2020        Patient weight is as follows:   Vitals:    25   Weight: 79.4 kg (175 lb)       Cultures:  No results found for the encounter in last 14 days.        I/O last 3 completed shifts:  In: 800 (10.1 mL/kg) [I.V.:800 (10.1 mL/kg)]  Out: 50 (0.6 mL/kg) [Blood:50]  Weight: 79.4 kg   I/O during current shift:  No intake/output data recorded.    Temp (24hrs), Av.5 °C (97.7 °F), Min:36.2 °C (97.2 °F), Max:36.7 °C (98.1 °F)         Assessment/Plan     Patient will be given a loading dose of 1750 mg.  Will initiate vancomycin maintenance, 1500 mg every 24 hours.    This dosing regimen is predicted by "[employer]Rx to result in the following pharmacokinetic parameters:  Loading dose: 1750 mg at 00:00 07/15/2025.  Regimen: 1500 mg IV every 24 hours.  Start time: 00:00 on 2025  Exposure target: AUC24 (range) 400-600 mg/L.hr   PUT85-37: 453 mg/L.hr  AUC24,ss: 513 mg/L.hr  Probability of AUC24 > 400: 75 %  Ctrough,ss: 14.7 mg/L  Probability of Ctrough,ss > 20: 27 %      Follow-up level will be ordered on  at 0500 unless clinically indicated sooner.  Will continue to monitor renal function daily while on vancomycin and order serum creatinine at least every 48 hours if not already ordered.  Follow for continued vancomycin needs, clinical response, and signs/symptoms of toxicity.       Eduarda BACA  Mary, PharmD

## 2025-07-15 NOTE — ANESTHESIA POSTPROCEDURE EVALUATION
Patient: Kevon Martinez    Procedure Summary       Date: 07/15/25 Room / Location: [facility] OR 07 / [facility] OR    Anesthesia Start: 1353 Anesthesia Stop: 1637    Procedure: Removal of exposed spinal cord stimulator and leads (Back) Diagnosis:       Wound dehiscence      Spinal cord stimulator dysfunction, initial encounter      Exposed orthopaedic hardware      (Wound dehiscence [T81.30XA])    Surgeons: Jefry SALCEDO MD Responsible Provider: Jose Dugan MD    Anesthesia Type: general ASA Status: 3            Anesthesia Type: general    Vitals Value Taken Time   /61 07/15/25 16:32   Temp 36.0 07/15/25 16:40   Pulse 67 07/15/25 16:36   Resp 11 07/15/25 16:36   SpO2 98 % 07/15/25 16:36   Vitals shown include unfiled device data.    Anesthesia Post Evaluation    Patient location during evaluation: PACU  Patient participation: complete - patient participated  Level of consciousness: awake  Pain management: adequate  Airway patency: patent  Cardiovascular status: acceptable  Respiratory status: acceptable  Hydration status: acceptable  Postoperative Nausea and Vomiting: none        There were no known notable events for this encounter.

## 2025-07-15 NOTE — PROGRESS NOTES
"Orthopaedic Surgery Progress Note    Subjective:  No acute events overnight. Pain well controlled. Denies chest pain, shortness of breath, or fevers. Planning for OR today. NPO. Consented this AM.    Objective:  /86   Pulse 71   Temp 36.5 °C (97.7 °F)   Resp 18   Ht 1.753 m (5' 9\")   Wt 79.4 kg (175 lb)   SpO2 95%   BMI 25.84 kg/m²     Gen: arousable, NAD, appropriately conversational  Cardiac: RRR to peripheral palpation  Resp: nonlabored on RA  GI: soft, nondistended    MSK:  Spine Exam:    L1: SILT       L2: SILT      Hip flexors 5/5 Left; 5/5 Right  L3: SILT      Knee extension 5/5 Left; 5/5 Right  L4: SILT      Tib Ant. (Dorsiflexion) 5/5 Left; 5/5 Right  L5: SILT      EHL 5/5 Left; 5/5 Right  S1: SILT      Plantarflexion 5/5 Left; 5/5 Right    No clonus    Results for orders placed or performed during the hospital encounter of 07/11/25 (from the past 24 hours)   POCT GLUCOSE   Result Value Ref Range    POCT Glucose 149 (H) 74 - 99 mg/dL   Type and screen   Result Value Ref Range    ABO TYPE A     Rh TYPE POS     ANTIBODY SCREEN NEG    POCT GLUCOSE   Result Value Ref Range    POCT Glucose 166 (H) 74 - 99 mg/dL   POCT GLUCOSE   Result Value Ref Range    POCT Glucose 248 (H) 74 - 99 mg/dL   POCT GLUCOSE   Result Value Ref Range    POCT Glucose 203 (H) 74 - 99 mg/dL   POCT GLUCOSE   Result Value Ref Range    POCT Glucose 210 (H) 74 - 99 mg/dL       CT thoracic spine wo IV contrast   Final Result   Addendum (preliminary) 1 of 1   Interpreted By:  Akua Blank,    ADDENDUM:   Additional images were obtained to include the patient's entire   stimulator device in the left gluteal region. No surrounding   inflammatory changes or fluid collections are seen.        Signed by: Akua Blank 7/12/2025 10:34 AM        -------- ORIGINAL REPORT --------   Dictation workstation:   [id number]      Final   1. Suboptimal evaluation of the spinal stimulator device which is   partially outside the field of view and further " limited by beam   hardening artifact. Within this limitation no surrounding   inflammatory changes or fluid collections.   2. Postsurgical changes of instrumented fusion of the L1-L2 with   intervertebral spacer without evidence of hardware complication.   3. 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        I personally reviewed the images/study and I agree with the findings   as stated by resident physician Danis Posada MD. This study was   interpreted at [place], [place].        MACRO:   None        Signed by: Akua Blank 7/12/2025 9:23 AM   Dictation workstation:   [id number]      CT lumbar spine wo IV contrast   Final Result   Addendum (preliminary) 1 of 1   Interpreted By:  Akua Blank,    ADDENDUM:   Additional images were obtained to include the patient's entire   stimulator device in the left gluteal region. No surrounding   inflammatory changes or fluid collections are seen.        Signed by: Akua Blank 7/12/2025 10:34 AM        -------- ORIGINAL REPORT --------   Dictation workstation:   [id number]      Final   1. Suboptimal evaluation of the spinal stimulator device which is   partially outside the field of view and further limited by beam   hardening artifact. Within this limitation no surrounding   inflammatory changes or fluid collections.   2. Postsurgical changes of instrumented fusion of the L1-L2 with   intervertebral spacer without evidence of hardware complication.   3. 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        I personally reviewed the images/study and I agree with the findings   as stated by resident physician Danis Posada MD. This study was   interpreted at [place], [place].        MACRO:   None        Signed by: Akua Blank 7/12/2025 9:23 AM   Dictation workstation:   [id number]      XR thoracic spine 3 views   Final Result   Spinal  stimulator leads project over the spinal canal within the   midthoracic spine. Visualized portions appear intact. Otherwise no   acute fracture or traumatic malalignment of the thoracic spine.        I personally reviewed the images/study and I agree with the findings   as stated by resident physician Danis Posada MD. This study was   interpreted at [facility],   [place], [place].        MACRO:   None        Signed by: Anthony García 7/11/2025 9:48 PM   Dictation workstation:   [id number]      XR chest 1 view   Final Result   1.  No evidence of acute cardiopulmonary process.                  MACRO:   None        Signed by: Buster Neely 7/11/2025 6:43 PM   Dictation workstation:   [id number]      XR pelvis 1-2 views   Final Result   No acute osseous abnormality seen             MACRO:   None        Signed by: Buster Neely 7/11/2025 6:42 PM   Dictation workstation:   [id number]      XR lumbar spine 2-3 views   Final Result   Posterior fusion L1-L2 without hardware failure   Multilevel retrolisthesis in the mid to lower lumbar spine. Severe   facet disease and moderate spondylosis in the lower lumbar spine             MACRO:   None        Signed by: Buster Neely 7/11/2025 6:39 PM   Dictation workstation:   [id number]      FL less than 1 hour    (Results Pending)       Assessment:  75 y.o. male 75M (DM, prostate cancer, s/p nerve stimulator in 2014) w exposed spinal cord stimulator for 3d. Denies any fever, chills, B/B issues. 5/5 BLUE and BLE. SILT. Afebrile. WBC 8.2. CRP 0.14. ESR 10.     Plan:  - Plan for OR with Dr. Fountain for I&D and wound closure, possible device revision 7/15   - Admitted to medicine, clearance pending for OR; Appreciate documentation of clearance by primary team  - Please obtain pre-operative labs/studies: T&S, PT/INR, CBC, BMP, CXR, EKG  - NPO for OR  - MRI T/L spine to include stimulator - cannot perform due to exposed hardware per MRI  - WBAT  - No  indication for transfusion pre-operatively  - DVT PPx: SCDs, okay for chemoppx per primary    Dispo: pending OR 7/15    Darian Leal MD  Orthopedic Surgery PGY-2  [facility]  Available by Epic Chat    While inpatient, this patient will be followed by the Orthopedic Spine team. See below for contact information.    This patient will be followed by the Orthopaedic Spine service. Please page or Epic Chat the corresponding residents below with questions or concerns.     Ortho Spine Service (Epic Chat Preferred)    First call: Darian Leal, PGY2  Second call: Jody Pool, PGY4

## 2025-07-15 NOTE — ANESTHESIA PREPROCEDURE EVALUATION
Patient: Kevon Martinez    Procedure Information       Date/Time: 07/15/25 1230    Procedure: Removal of exposed spinal cord stimulator and leads (Back)    Location: [facility] OR 07 / [facility] OR    Surgeons: Jefry SALCEDO MD            Relevant Problems   Cardiac   (+) Benign essential hypertension   (+) Hyperlipidemia      Pulmonary   (+) IKER (obstructive sleep apnea)      Neuro   (+) Depressive disorder      GI   (+) Gastroesophageal reflux disease      Endocrine   (+) Type 2 diabetes mellitus without complication       Clinical information reviewed:    Allergies  Meds  Problems  Med Hx  Surg Hx   Fam Hx  Soc Hx        NPO Detail:  No data recorded     Physical Exam    Airway  Mallampati: III  TM distance: >3 FB  Neck ROM: full     Cardiovascular    Dental    Pulmonary    Abdominal            Anesthesia Plan    History of general anesthesia?: yes  History of complications of general anesthesia?: no    ASA 3     general     intravenous induction   Anesthetic plan and risks discussed with patient.    Plan discussed with CRNA and CAA.

## 2025-07-15 NOTE — ANESTHESIA PROCEDURE NOTES
Airway  Date/Time: 7/15/2025 2:01 PM  Reason: elective    Airway not difficult    Staffing  Performed: ERIN   Authorized by: Jose Dugan MD    Performed by: Vini Lambert  Patient location during procedure: OR    Patient Condition  Indications for airway management: anesthesia and airway protection  Patient position: sniffing  Planned trial extubation  Sedation level: deep     Final Airway Details   Preoxygenated: yes  Final airway type: endotracheal airway  Successful airway: ETT  Cuffed: yes   Successful intubation technique: video laryngoscopy (Villela)  Adjuncts used in placement: intubating stylet  Endotracheal tube insertion site: oral  Blade: Declan  Blade size: #4  ETT size (mm): 7.5  Cormack-Lehane Classification: grade I - full view of glottis  Placement verified by: chest auscultation and capnometry   Measured from: lips  ETT to lips (cm): 23  Number of attempts at approach: 2  Number of other approaches attempted: 0    Additional Comments  First attempt DL by ERIN unsuccessful; second attempt with Villela successful with grade 1 view

## 2025-07-15 NOTE — OP NOTE
Date: 7/15/2025  OR Location: [facility] OR    Name: Kevon Martinez, : 1950, Age: 75 y.o., MRN: [id number], Sex: male    Diagnosis  Pre-op Diagnosis      * Wound dehiscence [T81.30XA]     * Spinal cord stimulator dysfunction, initial encounter [T85.192A]     * Exposed orthopaedic hardware [T84.498A] Post-op Diagnosis     * Wound dehiscence [T81.30XA]     * Spinal cord stimulator dysfunction, initial encounter [T85.192A]     * Exposed orthopaedic hardware [T84.498A]     Procedures  T6-7, T7-8 laminectomy, medial facetectomies, with foraminotomies  Removal of spinal cord stimulator  Irrigation and debridement of skin, muscle, fascia (wound size approx 8 x 2 cm)    Surgeons      * Jefry Fountain MD - Primary    Resident/Fellow/Other Assistant:  Surgeons and Role:     * CIARAN Sanders-C - KAYLA First Assist    No qualified orthopaedic surgery resident was available for the case. The assistance of CIARAN Perez's expertise was needed for positioning, exposure, decompression, instrumentation, fusion, and closure. It could not have been performed in the same capacity without an assistant of their caliber.       Procedure Summary  Anesthesia: General  ASA: III  Anesthesia Staff: Anesthesiologist: Jose Dugan MD  CRNA: DAMION Edmondson-CRNA; DAMION Teixeira-MAKI  SRNA: Vini Lambert  Estimated Blood Loss: 50 mL  Specimen:   ID Type Source Tests Collected by Time   1 : WOUND DEHISCENCE Tissue ABSCESS SURGICAL PATHOLOGY EXAM Jefry SALCEDO MD 7/15/2025 1444   2 : SPINAL CORD STIMULATOR Tissue HARDWARE SURGICAL PATHOLOGY EXAM Jefry SALCEDO MD 7/15/2025 1445        Staff:   Sundeepulator: Lilo  Circulator: Harry Parrish Person: Gurjit    Implants:    Nothing was implanted during the procedure      Findings: Wound dehiscence overlying the spinal cord stimulator generator with exposed hardware.  Leads tunneled up to the thoracic spine where they entered the spinal canal at T8-9.  Leads were  tethered and not easily removable, requiring laminectomy at T6-7 and T7-8.  Once exposed to the leads were able to be removed without issue.      Indications: Kevon Martinez is a 75 y.o. male who presented to [facility] on 7/11/2025 with concerns for exposure of their hardware from a spinal cord stimulator.  Patient states that he had a spinal cord stimulator placed back in 2016 and recently underwent a battery replacement for it in November 2024.  Over the course of the past week, he noticed that the wound overlying the spinal cord stimulator device had dehisced causing exposure of the hardware.  At the time there was clear drainage from the wound however he does not endorse any purulent discharge or fevers/chills.    Patient was transferred to [facility] as an ED to ED transfer due to lack of spine surgical coverage at [facility].  Our team evaluated the patient and noted the dehiscence overlying the spinal cord stimulator pack.  Patient does have a history of diabetes with last A1c 8.0% approximately 2 days ago.  He is CRP was 0.14 and ESR was 10, both normal.  No leukocytosis.  CT of the thoracic and lumbar spines were obtained which demonstrated a tunneled spinal cord stimulator with battery pack in the left lower lumbopelvic quadrant.  This was tunneled up into the mid thoracic spine where the leads were implanted into the spinal canal at approximately T8-9.  Patient did confirm with me that his spinal cord stimulator was in fact working well for him for his chronic low back pain.    Given the exposed hardware, I did discuss with him that we will likely need to remove the hardware in its entirety in order to minimize the risk of infection both currently and in the future.  Ultimately, we had an extensive discussion regarding surgery.  We discussed how we would remove the battery pack followed by the leads tunneled up into the thoracic spine and then remove the leads within the spinal canal.   We did discuss the need for possible laminectomy to accomplish this safely.  He understood all of the above.     The risks, benefits, complications, treatment options, non-operative alternatives, expected recovery and outcomes were discussed with the patient. The possibilities of reaction to medication, pulmonary aspiration, injury to surrounding structures, bleeding, recurrent infection, the need for additional procedures, failure to diagnose a condition, and creating a complication requiring transfusion or operation were discussed with the patient. I discussed the risks of surgery which includes but not limited to infection at the surgical site or wound healing requiring additional surgery to clean the infection and long term IV antibiotics. There is risk of blood loss requiring blood transfusion. Risk of dural tear requiring repair and possible continue cerebral spinal fluid leakage and positional headaches. Risk of nerve root injury resulting in temporary or permeant weakness, numbness, or radicular pain such as a nerve root palsy. Risk of epidural hematoma and compression resulting in weakness in extremity and bowel and bladder disturbances requiring additional surgery to clear out hematoma. Risk of needing to remain intubated after surgery for facial swelling. Risk of dysphagia or difficulty swallowing requiring feeding tube briefly. Risk of blindness if need to be in a prone position for surgery. Other complications include skin breakdown or skin blistering from being prone for long hours, developing MI, stroke, DVT, PE during or after surgery. Risk of death. Risk of complications from anesthesia requiring prolonged intubation.      The patient concurred with the proposed plan, giving informed consent. The site of surgery was properly noted/marked if necessary per policy.         Procedure Details: Patient was identified in the preoperative area, consent reviewed, operative area marked. Taken to the  operating room where general anesthesia was induced. SCDs were placed on the lower extremities.  Leads for SSEP neuromonitoring were placed in the upper and lower extremities by the neuromonitoring team.  Patient was then carefully rolled prone on an open Saw frame with the abdomen hanging free.  All bony prominences were well padded.  Baseline SSEP signals were obtained in the prone position which appeared to have good signals both in the upper and lower extremities.     The patient was then prepped and draped in normal sterile fashion. They received preoperative Ancef for prophylactic antibiosis within 30 minutes of the incision. Time-out was performed and all in the room were in agreement.    I first focused on the left lower lumbopelvic quadrant with the wound dehiscence overlying the battery pack.  I created a fishmouth incision to cut out the wound dehiscence.  This revealed the battery pack/generator for the spinal cord stimulator which appeared to be placed in the subcutaneous tissue overlying the fascia.  The cavity within which the generator was contained was thoroughly debrided with a curette.  This included skin, muscle, and fascia.  Some of this tissue was sent to pathology.  There was no gross purulence or infection.      I then proceeded to expose the thoracic spine.  I used the patient's prior midline upper thoracic incision.  This was carried through skin and subcutaneous tissue.  Meticulous hemostasis which was achieved. The incision was carried out over the operative levels with subperiosteal dissection out to the tips of the transverse processes at T8-9.  I was able to visualize the spinal cord stimulator wires that were proceeding into the canal.  There were small fasteners that were tied to the surrounding fascia with FiberWire.  I carefully exposed to this suture and cut it freeing the fasteners and the wires.  I then gently pulled on the wires in order to remove the leads.  Unfortunately,  there was a lot of tension when pulling and I could not remove the leads from the spinal canal.    At this juncture, I elected to perform a laminectomy over the overlying bony regions in order to expose the leads directly.  I carried my incision cephalad over the T6-7 and T7-8 intervals.  The laminae were exposed subperiosteally.  A Leksell rongeur was then used to remove the spinous processes of T6 and T7 in addition to the cephalad aspect of T8. We thinned down the dorsal cortices and then carefully entered the spinal canal first at T7-8, identifying the plane between the ligamentum flavum and the dural sac. The flavum was then excised in its entirety from pedicle to pedicle. Laminectomies were completed of T7-8, along with partial medial facetectomies and foraminotomies.  I then carried my decompression cephalad and removed to the bony ring over the cephalad aspect of the T7 lamina.  This then carried me into the T6-7 laminectomy bed.. The flavum at T6-7 was then excised in its entirety from pedicle to pedicle. Laminectomies were completed of T6-7, along with partial medial facetectomies and foraminotomies.  Hemostasis was achieved with bone wax over the bony edges.    Once the laminectomy was complete, I noted that there was a significant rind of scar tissue overlying the leads.  It was unclear whether this was dura or scar tissue.  I then carefully dissected down the pathway of the wires to where they were entering the spinal canal.  I was able to visualize where the wires ultimately transitioned into the lead pads for the SCS.  Once this was carefully exposed, I was able to appreciate that the overlying tissue was likely scar tissue.  However, in the lieu of resecting this, I carefully pulled on the exposed to lead pad which was able to safely remove the entirety of the lead pad without issue.  This confirmed that the overlying tissue was scarring not necessarily dry.    There was no egress of spinal fluid  when this was accomplished.  SSEP signals were stable throughout this whole process.  At this juncture, we proceeded with irrigating.  We used a 3 L bag of normal saline with cystoscopy tubing to thoroughly irrigate both the thoracic wound bed in addition to the lower lumbar wound.  Mechanical debridement was performed with curettage.    At this juncture, I brought in C-arm fluoroscopy in order to obtain AP views both of the thoracic and the lumbar spines in order to confirm complete removal of all hardware.  I then placed a deep Hemovac drain in the thoracic wound.  I then confirmed hemostasis with a combination of Gelfoam, bipolar electrocautery, and bone wax.    2 g of vancomycin powder were placed into the wounds.  We then commenced closure which included 0-Vicryl in the fascia, 2-0 Vicryl in the subcutaneous tissue, and 2-0 nylon for skin.  Wounds were dressed sterilely with silver Mepilexes.     atient was rolled supine, and taken to recovery in stable condition.     Complications:  None; patient tolerated the procedure well.     Disposition: PACU - hemodynamically stable.  Condition: stable     Additional Details: SSEP neuromonitoring signals were stable throughout the case     Attending Attestation: I was present and scrubbed for the entire procedure.    --    Jefry Fountain MD  Orthopaedic Spine Surgery  , Department of Orthopaedic Surgery  [facility]

## 2025-07-15 NOTE — PROGRESS NOTES
INTERNAL MEDICINE PROGRESS NOTE            SUBJECTIVE     Pt was seen and examined at [facility]. No acute events overnight.  Pt denies any worsening pain at the back     OBJECTIVE       Visit Vitals  /86   Pulse 71   Temp 36.5 °C (97.7 °F)   Resp 18      No intake or output data in the 24 hours ending 07/15/25 0833     Physical Exam   General Appearance: Alert, Oriented X3, Cooperative, Not in Acute Distress  HEENT: no eye redness, not pale, no jaundice, Throat: not congested, no ulcers    Chest: Good AE bilateral, No crackles, no ronchies, no wheezes.   Heart: RHR, Normal heart sounds S1, S2, no murmur or gallop,   Abdomen: Soft, lax, no hepatosplenomegaly, intact hernia orifices, negative quiros sign, no tenderness,  Lowe back with stimulant exposed, area clean and dry, no tenderness or erythema  Genitalia: no abnormal discharge, no ulcers, no swelling.  Lymphatics: no lymphadenopathy, supraclavicular, inguinal trochlear, or axilla.   Neurology: Intact cranial nerves 2 to 12, intact sensation, intact motor function (Power, tone and reflexes). Normal DTR reflexes.   Extremities: no signs of PAD, no swelling no ulcers   Back: no deformity, no SI tenderness, no ulcers.   Skin: no signs of dehydration, no Rash, Bruises, or purpura.        Current Meds   [Scheduled Medications]     [Scheduled Medications]  [Held by provider] enoxaparin, 40 mg, subcutaneous, q24h  insulin glargine, 10 Units, subcutaneous, Daily before breakfast  insulin lispro, 0-10 Units, subcutaneous, TID AC  sennosides-docusate sodium, 1 tablet, oral, BID  [PRN Medications]     [PRN Medications]  PRN medications: dextrose, dextrose, glucagon, glucagon, hydrALAZINE,  "HYDROcodone-acetaminophen, melatonin, prochlorperazine     LABS and IMAGING      No results found for: \"WBC\", \"HGB\", \"HCT\", \"GLU\", \"CO2\", \"CREATININE\", \"CALCIUM\", \"INR\", \"PTT\", \"TROPONINI\"     ECG 12 lead  Normal sinus rhythm  Normal ECG  When compared with ECG of 04-MAY-2022 13:01,  PREVIOUS ECG IS PRESENT     See ED provider note for full interpretation and clinical correlation  Confirmed by Jocelin Hightower ([id number]) on 7/12/2025 2:28:33 AM        Relevant labs and studies independently reviewed and interpreted.        PROBLEM LISTS       Problem List Items Addressed This Visit         * (Principal) Wound dehiscence - Primary     Relevant Orders     Case Request Operating Room: DEBRIDEMENT, BACK (Completed)            ASSESSMENT / PLANS       Mr. Martinez is a 75 year old male with a PMH of T2DM (last HgA1c 8.05 7/11/25), OA, IKER, HLD, GERD, prostate cancer, HTN, and sacroiliitis. Pt presented to ED as transfer from OSH for further evaluation of exposed nerve stimulator. [facility] consulted  recommend OR.   Off ABX at this time no signs of infection, no signs of sepsis, denied fever.   Ortho spine rec Pelvic MRI- Ortho planning for OR on 7/15 with Dr. Fountain for I&D and wound closure, possible device revision 7/15      #. Wound dehiscence with exposed nerve stimulator   - exposed wound  - Labs and images no signs of active infection   - ortho spine on board   - Pain regimen: c/w home dose of Norco 1 tab q8h PRN moderate pain and Morphine 2 mg q4h PRN severe pain (x24 hrs).  - Ortho-spine on board rec MRI MRI T/L spine to include stimulator - cannot perform due to exposed hardware per MRI        #. Co-morbidities:  T2DM (last HgA1c 8.0% 7/11/25)  - home regimen: Metformin  mg PO TID and Lantus 38 units every morning  - holding home dose of Metformin will resume on discharge  - Lispro SSI 0-10 units ordered  - Lantus 10 units at every morning (can titrate dose as needed, lower dose ordered to prevent episodes of " hypoglycemia)  - accucheck ACHS     HTN  - no home meds  - BP on admit 177/88  - continue to monitor BP  - Hydralazine 25 mg PO TID PRN SBP>180/DBP>100  - after pain control if BP persist to be high will add Amlodipine, ACE as tolerated after surgery     Sacroiliitis  - home regimen: Norco 1 tab PO q8h PRN mod-severe pain. Pt may take additional dose 10 days out of month (last filled via [facility] 6/15/25 100 tabs/30 days)  - Norvo 1 tab PO q8h PRN mod-severe pain     #. Pre-op - eval  PRE OPERATIVE RISK ASSESSMENT  Preoperative cardiovascular risk assessment  Type of surgery: Ortho-spine   Low to Intermediate  Pmhx:   History of stroke: No   Diabetes on insulin: Yes  CKD with creatinine> 2: No  Prior history of MI: No  History of CHF: No  No history of angina, CAD, PAD, valvular heart disease or heart failure  No known history of structural respiratory disease; COPD, ILD.   Cardiac Symptoms   ROS:  1) Chest pain-  Denies  2) Shortness of breath- Denies  3) LL swelling- Denies  4) Orthopnea/PND- Denies  5) Loss of consciousness- Never  6) Exercise capacity- 2 blocks on level ground: Yes  ECG today shows normal sinus rhythm and no acute ischemia.   Impression:  RCRI score of  1 According to this she has  1.1 % a 30 day risk of death, MI or cardiac arrest, during surgery      Code Status: Full  DVT ppx:  enxop on hold for surgery will discuss with surgery when to resume  Disp: PT/ot       Ankur Funes MD

## 2025-07-15 NOTE — CARE PLAN
The patient's goals for the shift include  pain level to be under a 5.     The clinical goals for the shift include Patient to remain HDS      Problem: Pain - Adult  Goal: Verbalizes/displays adequate comfort level or baseline comfort level  Outcome: Progressing     Problem: Safety - Adult  Goal: Free from fall injury  Outcome: Progressing     Problem: Discharge Planning  Goal: Discharge to home or other facility with appropriate resources  Outcome: Progressing     Problem: Chronic Conditions and Co-morbidities  Goal: Patient's chronic conditions and co-morbidity symptoms are monitored and maintained or improved  Outcome: Progressing     Problem: Nutrition  Goal: Nutrient intake appropriate for maintaining nutritional needs  Outcome: Progressing     Problem: Fall/Injury  Goal: Not fall by end of shift  Outcome: Progressing  Goal: Be free from injury by end of the shift  Outcome: Progressing  Goal: Verbalize understanding of personal risk factors for fall in the hospital  Outcome: Progressing  Goal: Verbalize understanding of risk factor reduction measures to prevent injury from fall in the home  Outcome: Progressing  Goal: Use assistive devices by end of the shift  Outcome: Progressing  Goal: Pace activities to prevent fatigue by end of the shift  Outcome: Progressing     Problem: Pain  Goal: Takes deep breaths with improved pain control throughout the shift  Outcome: Progressing  Goal: Turns in bed with improved pain control throughout the shift  Outcome: Progressing  Goal: Walks with improved pain control throughout the shift  Outcome: Progressing  Goal: Performs ADL's with improved pain control throughout shift  Outcome: Progressing  Goal: Participates in PT with improved pain control throughout the shift  Outcome: Progressing  Goal: Free from opioid side effects throughout the shift  Outcome: Progressing  Goal: Free from acute confusion related to pain meds throughout the shift  Outcome: Progressing     Problem:  Respiratory  Goal: Clear secretions with interventions this shift  Outcome: Progressing  Goal: Minimize anxiety/maximize coping throughout shift  Outcome: Progressing  Goal: Minimal/no exertional discomfort or dyspnea this shift  Outcome: Progressing  Goal: No signs of respiratory distress (eg. Use of accessory muscles. Peds grunting)  Outcome: Progressing  Goal: Patent airway maintained this shift  Outcome: Progressing  Goal: Tolerate mechanical ventilation evidenced by VS/agitation level this shift  Outcome: Progressing  Goal: Tolerate pulmonary toileting this shift  Outcome: Progressing  Goal: Verbalize decreased shortness of breath this shift  Outcome: Progressing  Goal: Wean oxygen to maintain O2 saturation per order/standard this shift  Outcome: Progressing  Goal: Increase self care and/or family involvement in next 24 hours  Outcome: Progressing

## 2025-07-16 LAB
ALBUMIN SERPL BCP-MCNC: 4.6 G/DL (ref 3.4–5)
ANION GAP SERPL CALC-SCNC: 18 MMOL/L (ref 10–20)
BASOPHILS # BLD AUTO: 0.05 X10*3/UL (ref 0–0.1)
BASOPHILS NFR BLD AUTO: 0.3 %
BUN SERPL-MCNC: 18 MG/DL (ref 6–23)
CALCIUM SERPL-MCNC: 9.9 MG/DL (ref 8.6–10.6)
CHLORIDE SERPL-SCNC: 97 MMOL/L (ref 98–107)
CO2 SERPL-SCNC: 20 MMOL/L (ref 21–32)
CREAT SERPL-MCNC: 1.11 MG/DL (ref 0.5–1.3)
EGFRCR SERPLBLD CKD-EPI 2021: 69 ML/MIN/1.73M*2
EOSINOPHIL # BLD AUTO: 0 X10*3/UL (ref 0–0.4)
EOSINOPHIL NFR BLD AUTO: 0 %
ERYTHROCYTE [DISTWIDTH] IN BLOOD BY AUTOMATED COUNT: 12.1 % (ref 11.5–14.5)
GLUCOSE BLD MANUAL STRIP-MCNC: 204 MG/DL (ref 74–99)
GLUCOSE BLD MANUAL STRIP-MCNC: 209 MG/DL (ref 74–99)
GLUCOSE BLD MANUAL STRIP-MCNC: 211 MG/DL (ref 74–99)
GLUCOSE BLD MANUAL STRIP-MCNC: 225 MG/DL (ref 74–99)
GLUCOSE SERPL-MCNC: 204 MG/DL (ref 74–99)
HCT VFR BLD AUTO: 44.2 % (ref 41–52)
HGB BLD-MCNC: 14.8 G/DL (ref 13.5–17.5)
IMM GRANULOCYTES # BLD AUTO: 0.11 X10*3/UL (ref 0–0.5)
IMM GRANULOCYTES NFR BLD AUTO: 0.6 % (ref 0–0.9)
LYMPHOCYTES # BLD AUTO: 1.4 X10*3/UL (ref 0.8–3)
LYMPHOCYTES NFR BLD AUTO: 7.6 %
MCH RBC QN AUTO: 30.3 PG (ref 26–34)
MCHC RBC AUTO-ENTMCNC: 33.5 G/DL (ref 32–36)
MCV RBC AUTO: 91 FL (ref 80–100)
MONOCYTES # BLD AUTO: 2.17 X10*3/UL (ref 0.05–0.8)
MONOCYTES NFR BLD AUTO: 11.8 %
NEUTROPHILS # BLD AUTO: 14.73 X10*3/UL (ref 1.6–5.5)
NEUTROPHILS NFR BLD AUTO: 79.7 %
NRBC BLD-RTO: 0 /100 WBCS (ref 0–0)
PHOSPHATE SERPL-MCNC: 3.6 MG/DL (ref 2.5–4.9)
PLATELET # BLD AUTO: 245 X10*3/UL (ref 150–450)
POTASSIUM SERPL-SCNC: 4 MMOL/L (ref 3.5–5.3)
RBC # BLD AUTO: 4.88 X10*6/UL (ref 4.5–5.9)
SODIUM SERPL-SCNC: 131 MMOL/L (ref 136–145)
VANCOMYCIN SERPL-MCNC: 13.1 UG/ML (ref 5–20)
WBC # BLD AUTO: 18.5 X10*3/UL (ref 4.4–11.3)

## 2025-07-16 PROCEDURE — 99232 SBSQ HOSP IP/OBS MODERATE 35: CPT

## 2025-07-16 PROCEDURE — 2500000001 HC RX 250 WO HCPCS SELF ADMINISTERED DRUGS (ALT 637 FOR MEDICARE OP)

## 2025-07-16 PROCEDURE — 2500000001 HC RX 250 WO HCPCS SELF ADMINISTERED DRUGS (ALT 637 FOR MEDICARE OP): Performed by: STUDENT IN AN ORGANIZED HEALTH CARE EDUCATION/TRAINING PROGRAM

## 2025-07-16 PROCEDURE — 2500000004 HC RX 250 GENERAL PHARMACY W/ HCPCS (ALT 636 FOR OP/ED)

## 2025-07-16 PROCEDURE — 36415 COLL VENOUS BLD VENIPUNCTURE: CPT

## 2025-07-16 PROCEDURE — 85025 COMPLETE CBC W/AUTO DIFF WBC: CPT

## 2025-07-16 PROCEDURE — 2500000002 HC RX 250 W HCPCS SELF ADMINISTERED DRUGS (ALT 637 FOR MEDICARE OP, ALT 636 FOR OP/ED): Performed by: STUDENT IN AN ORGANIZED HEALTH CARE EDUCATION/TRAINING PROGRAM

## 2025-07-16 PROCEDURE — 80069 RENAL FUNCTION PANEL: CPT

## 2025-07-16 PROCEDURE — 2500000004 HC RX 250 GENERAL PHARMACY W/ HCPCS (ALT 636 FOR OP/ED): Mod: TB | Performed by: STUDENT IN AN ORGANIZED HEALTH CARE EDUCATION/TRAINING PROGRAM

## 2025-07-16 PROCEDURE — 1210000001 HC SEMI-PRIVATE ROOM DAILY

## 2025-07-16 PROCEDURE — 82947 ASSAY GLUCOSE BLOOD QUANT: CPT

## 2025-07-16 PROCEDURE — 2500000004 HC RX 250 GENERAL PHARMACY W/ HCPCS (ALT 636 FOR OP/ED): Mod: TB

## 2025-07-16 PROCEDURE — 80202 ASSAY OF VANCOMYCIN: CPT

## 2025-07-16 RX ORDER — ACETAMINOPHEN 325 MG/1
650 TABLET ORAL EVERY 6 HOURS PRN
Status: DISCONTINUED | OUTPATIENT
Start: 2025-07-16 | End: 2025-07-17 | Stop reason: HOSPADM

## 2025-07-16 RX ORDER — OXYCODONE HYDROCHLORIDE 5 MG/1
5 TABLET ORAL EVERY 6 HOURS PRN
Refills: 0 | Status: DISCONTINUED | OUTPATIENT
Start: 2025-07-16 | End: 2025-07-17 | Stop reason: HOSPADM

## 2025-07-16 RX ORDER — CEPHALEXIN 500 MG/1
500 CAPSULE ORAL 2 TIMES DAILY
Qty: 14 CAPSULE | Refills: 0 | Status: CANCELLED | OUTPATIENT
Start: 2025-07-18 | End: 2025-07-25

## 2025-07-16 RX ADMIN — OXYCODONE HYDROCHLORIDE 5 MG: 5 TABLET ORAL at 17:57

## 2025-07-16 RX ADMIN — ONDANSETRON 4 MG: 2 INJECTION INTRAMUSCULAR; INTRAVENOUS at 16:08

## 2025-07-16 RX ADMIN — INSULIN LISPRO 4 UNITS: 100 INJECTION, SOLUTION INTRAVENOUS; SUBCUTANEOUS at 09:16

## 2025-07-16 RX ADMIN — PIPERACILLIN SODIUM AND TAZOBACTAM SODIUM 3.38 G: 3; .375 INJECTION, SOLUTION INTRAVENOUS at 09:16

## 2025-07-16 RX ADMIN — INSULIN GLARGINE 10 UNITS: 100 INJECTION, SOLUTION SUBCUTANEOUS at 09:22

## 2025-07-16 RX ADMIN — PIPERACILLIN SODIUM AND TAZOBACTAM SODIUM 3.38 G: 3; .375 INJECTION, SOLUTION INTRAVENOUS at 20:36

## 2025-07-16 RX ADMIN — INSULIN LISPRO 4 UNITS: 100 INJECTION, SOLUTION INTRAVENOUS; SUBCUTANEOUS at 12:17

## 2025-07-16 RX ADMIN — HYDROMORPHONE HYDROCHLORIDE 0.2 MG: 1 INJECTION, SOLUTION INTRAMUSCULAR; INTRAVENOUS; SUBCUTANEOUS at 00:57

## 2025-07-16 RX ADMIN — HYDROCODONE BITARTRATE AND ACETAMINOPHEN 1 TABLET: 5; 325 TABLET ORAL at 06:39

## 2025-07-16 RX ADMIN — VANCOMYCIN HYDROCHLORIDE 1500 MG: 1.5 INJECTION, POWDER, LYOPHILIZED, FOR SOLUTION INTRAVENOUS at 21:27

## 2025-07-16 RX ADMIN — HYDROCODONE BITARTRATE AND ACETAMINOPHEN 1 TABLET: 5; 325 TABLET ORAL at 15:59

## 2025-07-16 RX ADMIN — HYDROMORPHONE HYDROCHLORIDE 0.4 MG: 1 INJECTION, SOLUTION INTRAMUSCULAR; INTRAVENOUS; SUBCUTANEOUS at 09:32

## 2025-07-16 RX ADMIN — PIPERACILLIN SODIUM AND TAZOBACTAM SODIUM 3.38 G: 3; .375 INJECTION, SOLUTION INTRAVENOUS at 15:58

## 2025-07-16 RX ADMIN — SENNOSIDES AND DOCUSATE SODIUM 1 TABLET: 8.6; 5 TABLET ORAL at 09:17

## 2025-07-16 RX ADMIN — PIPERACILLIN SODIUM AND TAZOBACTAM SODIUM 3.38 G: 3; .375 INJECTION, SOLUTION INTRAVENOUS at 02:30

## 2025-07-16 RX ADMIN — INSULIN LISPRO 4 UNITS: 100 INJECTION, SOLUTION INTRAVENOUS; SUBCUTANEOUS at 17:45

## 2025-07-16 ASSESSMENT — PAIN DESCRIPTION - DESCRIPTORS: DESCRIPTORS: ACHING

## 2025-07-16 ASSESSMENT — COGNITIVE AND FUNCTIONAL STATUS - GENERAL
CLIMB 3 TO 5 STEPS WITH RAILING: A LITTLE
WALKING IN HOSPITAL ROOM: A LITTLE
DAILY ACTIVITIY SCORE: 24
MOBILITY SCORE: 22

## 2025-07-16 ASSESSMENT — PAIN SCALES - GENERAL
PAINLEVEL_OUTOF10: 8
PAINLEVEL_OUTOF10: 0 - NO PAIN
PAINLEVEL_OUTOF10: 9
PAINLEVEL_OUTOF10: 0 - NO PAIN
PAINLEVEL_OUTOF10: 0 - NO PAIN

## 2025-07-16 ASSESSMENT — PAIN - FUNCTIONAL ASSESSMENT
PAIN_FUNCTIONAL_ASSESSMENT: 0-10
PAIN_FUNCTIONAL_ASSESSMENT: WONG-BAKER FACES
PAIN_FUNCTIONAL_ASSESSMENT: 0-10
PAIN_FUNCTIONAL_ASSESSMENT: 0-10

## 2025-07-16 ASSESSMENT — PAIN SCALES - WONG BAKER: WONGBAKER_NUMERICALRESPONSE: NO HURT

## 2025-07-16 NOTE — CARE PLAN
Problem: Pain - Adult  Goal: Verbalizes/displays adequate comfort level or baseline comfort level  Outcome: Progressing     Problem: Safety - Adult  Goal: Free from fall injury  Outcome: Progressing     Problem: Discharge Planning  Goal: Discharge to home or other facility with appropriate resources  Outcome: Progressing     Problem: Chronic Conditions and Co-morbidities  Goal: Patient's chronic conditions and co-morbidity symptoms are monitored and maintained or improved  Outcome: Progressing     Problem: Nutrition  Goal: Nutrient intake appropriate for maintaining nutritional needs  Outcome: Progressing     Problem: Fall/Injury  Goal: Not fall by end of shift  Outcome: Progressing  Goal: Be free from injury by end of the shift  Outcome: Progressing  Goal: Verbalize understanding of personal risk factors for fall in the hospital  Outcome: Progressing  Goal: Verbalize understanding of risk factor reduction measures to prevent injury from fall in the home  Outcome: Progressing  Goal: Use assistive devices by end of the shift  Outcome: Progressing  Goal: Pace activities to prevent fatigue by end of the shift  Outcome: Progressing     Problem: Pain  Goal: Takes deep breaths with improved pain control throughout the shift  Outcome: Progressing  Goal: Turns in bed with improved pain control throughout the shift  Outcome: Progressing  Goal: Walks with improved pain control throughout the shift  Outcome: Progressing  Goal: Performs ADL's with improved pain control throughout shift  Outcome: Progressing  Goal: Participates in PT with improved pain control throughout the shift  Outcome: Progressing  Goal: Free from opioid side effects throughout the shift  Outcome: Progressing  Goal: Free from acute confusion related to pain meds throughout the shift  Outcome: Progressing     Problem: Respiratory  Goal: Clear secretions with interventions this shift  Outcome: Progressing  Goal: Minimize anxiety/maximize coping throughout  shift  Outcome: Progressing  Goal: Minimal/no exertional discomfort or dyspnea this shift  Outcome: Progressing  Goal: No signs of respiratory distress (eg. Use of accessory muscles. Peds grunting)  Outcome: Progressing  Goal: Patent airway maintained this shift  Outcome: Progressing  Goal: Tolerate mechanical ventilation evidenced by VS/agitation level this shift  Outcome: Progressing  Goal: Tolerate pulmonary toileting this shift  Outcome: Progressing  Goal: Verbalize decreased shortness of breath this shift  Outcome: Progressing  Goal: Wean oxygen to maintain O2 saturation per order/standard this shift  Outcome: Progressing  Goal: Increase self care and/or family involvement in next 24 hours  Outcome: Progressing   The patient's goals for the shift include      The clinical goals for the shift include Patient to remain HDS

## 2025-07-16 NOTE — DISCHARGE INSTR - OTHER ORDERS
Wound Care  Back (Thoracic Spine) Incision  -Change your dressing daily until there is no drainage from your incision site for 24-48 hours.         -The surgical site may be left open to air once drainage has stopped.   -Use an abdominal pad and paper tape for dressing changes.  -There are running sutures in your incision, they do not need to be removed.  -No lotions, creams, or tub soaks.  -May use heat or ice to your back as needed for comfort.

## 2025-07-16 NOTE — PROGRESS NOTES
INTERNAL MEDICINE PROGRESS NOTE         SUBJECTIVE     Pt was seen and examined at beside. No acute events overnight. Pt reports feeling fine, severe pain at site of drain. Pt denies any CP, sob, fever    OBJECTIVE      Visit Vitals  /88   Pulse 87   Temp 36.3 °C (97.3 °F)   Resp 17        Intake/Output Summary (Last 24 hours) at 7/16/2025 0719  Last data filed at 7/16/2025 0631  Gross per 24 hour   Intake 800 ml   Output 1700 ml   Net -900 ml       Physical Exam   GEN: well-appearing, no acute distress  HEENT: PERRLA, EOMI, moist mucous membranes, no scleral icterus  NECK: Supple  CV: RRR, no murmurs/rubs/gallops  PULM: Breathing comfortably, clear to auscultation bilaterally  AB: Soft, non-tender, non-distended, drain insitu at lower back, area looks clean and dry.  : No in-dwelling kwong  MSK: No lower extremity edema bilaterally  VASC: DP/PT pulses palpable bilaterally  NEURO: A&Ox3, following commands, conversational    Current Meds   Scheduled Medications[1]   PRN Medications[2]     LABS and IMAGING     Results for orders placed or performed during the hospital encounter of 07/11/25 (from the past 24 hours)   POCT GLUCOSE   Result Value Ref Range    POCT Glucose 173 (H) 74 - 99 mg/dL   POCT GLUCOSE   Result Value Ref Range    POCT Glucose 244 (H) 74 - 99 mg/dL   Vancomycin   Result Value Ref Range    Vancomycin 13.1 5.0 - 20.0 ug/mL   POCT GLUCOSE   Result Value Ref Range    POCT Glucose 225 (H) 74 - 99 mg/dL   Renal function panel   Result Value Ref Range    Glucose 204 (H) 74 - 99 mg/dL    Sodium 131 (L) 136 - 145 mmol/L    Potassium 4.0 3.5 - 5.3 mmol/L    Chloride 97 (L) 98 - 107 mmol/L    Bicarbonate 20 (L) 21 - 32  "mmol/L    Anion Gap 18 10 - 20 mmol/L    Urea Nitrogen 18 6 - 23 mg/dL    Creatinine 1.11 0.50 - 1.30 mg/dL    eGFR 69 >60 mL/min/1.73m*2    Calcium 9.9 8.6 - 10.6 mg/dL    Phosphorus 3.6 2.5 - 4.9 mg/dL    Albumin 4.6 3.4 - 5.0 g/dL   CBC and Auto Differential   Result Value Ref Range    WBC 18.5 (H) 4.4 - 11.3 x10*3/uL    nRBC 0.0 0.0 - 0.0 /100 WBCs    RBC 4.88 4.50 - 5.90 x10*6/uL    Hemoglobin 14.8 13.5 - 17.5 g/dL    Hematocrit 44.2 41.0 - 52.0 %    MCV 91 80 - 100 fL    MCH 30.3 26.0 - 34.0 pg    MCHC 33.5 32.0 - 36.0 g/dL    RDW 12.1 11.5 - 14.5 %    Platelets 245 150 - 450 x10*3/uL    Neutrophils % 79.7 40.0 - 80.0 %    Immature Granulocytes %, Automated 0.6 0.0 - 0.9 %    Lymphocytes % 7.6 13.0 - 44.0 %    Monocytes % 11.8 2.0 - 10.0 %    Eosinophils % 0.0 0.0 - 6.0 %    Basophils % 0.3 0.0 - 2.0 %    Neutrophils Absolute 14.73 (H) 1.60 - 5.50 x10*3/uL    Immature Granulocytes Absolute, Automated 0.11 0.00 - 0.50 x10*3/uL    Lymphocytes Absolute 1.40 0.80 - 3.00 x10*3/uL    Monocytes Absolute 2.17 (H) 0.05 - 0.80 x10*3/uL    Eosinophils Absolute 0.00 0.00 - 0.40 x10*3/uL    Basophils Absolute 0.05 0.00 - 0.10 x10*3/uL   POCT GLUCOSE   Result Value Ref Range    POCT Glucose 211 (H) 74 - 99 mg/dL      No results found for: \"WBC\", \"HGB\", \"HCT\", \"GLU\", \"CO2\", \"CREATININE\", \"CALCIUM\", \"INR\", \"PTT\", \"TROPONINI\"    FL fluoro images no charge  These images are not reportable by radiology and will not be interpreted   by  Radiologists.       Relevant labs and studies independently reviewed and interpreted.      PROBLEM LISTS      Problem List Items Addressed This Visit       * (Principal) Wound dehiscence - Primary    Relevant Orders    Case Request Operating Room: DEBRIDEMENT, BACK (Completed)    Surgical Pathology Exam     Other Visit Diagnoses         Spinal cord stimulator dysfunction, initial encounter        Relevant Orders    Surgical Pathology Exam      Exposed orthopaedic hardware        Relevant Orders "    Surgical Pathology Exam              ASSESSMENT / PLANS         Mr. Martinez is a 75 year old male with a PMH of T2DM (last HgA1c 8.05 7/11/25), OA, IKER, HLD, GERD, prostate cancer, HTN, and sacroiliitis. Pt presented to ED as transfer from OSH for further evaluation of exposed nerve stimulator. [facility] consulted  recommend OR s/p OR on 7/15 with Dr. Fountain for I&D and wound closure now with drain  possibly to be pulled out tomorrow 7/16  Off ABX at this time no signs of infection, no signs of sepsis, denied fever.       Update:  -pending PT/OT eval  -possible dc tomorrow on po Abx  -pain mgt    #. Wound dehiscence with exposed nerve stimulator   - exposed wound  - Labs and images no signs of active infection   - ortho spine on board   - Pain regimen: c/w home dose of Norco 1 tab q8h PRN moderate pain and Morphine 2 mg q4h PRN severe pain (x24 hrs).  - Ortho-spine on board rec MRI MRI T/L spine to include stimulator - cannot perform due to exposed hardware per MRI         #. Co-morbidities:  T2DM (last HgA1c 8.0% 7/11/25)  - home regimen: Metformin  mg PO TID and Lantus 38 units every morning  - holding home dose of Metformin will resume on discharge  - Lispro SSI 0-10 units ordered  - Lantus 10 units at every morning (can titrate dose as needed, lower dose ordered to prevent episodes of hypoglycemia)  - accucheck ACHS     HTN  - no home meds  - BP on admit 177/88  - continue to monitor BP  - Hydralazine 25 mg PO TID PRN SBP>180/DBP>100  - BP stable      Sacroiliitis  - home regimen: Norco 1 tab PO q8h PRN mod-severe pain. Pt may take additional dose 10 days out of month (last filled via [facility] 6/15/25 100 tabs/30 days)  - Norvo 1 tab PO q8h PRN mod-severe pain, will hold and give oxycodone for now.  -dilaudid 0.4mg prn for severe pain     #. Pre-op - eval  PRE OPERATIVE RISK ASSESSMENT  Preoperative cardiovascular risk assessment  Type of surgery: Ortho-spine   Low to Intermediate  Pmhx:   History of  stroke: No   Diabetes on insulin: Yes  CKD with creatinine> 2: No  Prior history of MI: No  History of CHF: No  No history of angina, CAD, PAD, valvular heart disease or heart failure  No known history of structural respiratory disease; COPD, ILD.   Cardiac Symptoms   ROS:  1) Chest pain-  Denies  2) Shortness of breath- Denies  3) LL swelling- Denies  4) Orthopnea/PND- Denies  5) Loss of consciousness- Never  6) Exercise capacity- 2 blocks on level ground: Yes  ECG today shows normal sinus rhythm and no acute ischemia.   Impression:  RCRI score of  1 According to this she has  1.1 % a 30 day risk of death, MI or cardiac arrest, during surgery      Code Status: Full  DVT ppx:  enxop on hold for surgery will discuss with surgery when to resume  Disp: PT/ot pending, discharge on po Abx  AC 72 hrs post op        Ankur Funes MD                   [1] [Held by provider] enoxaparin, 40 mg, subcutaneous, q24h  insulin glargine, 10 Units, subcutaneous, Daily before breakfast  insulin lispro, 0-10 Units, subcutaneous, TID AC  piperacillin-tazobactam, 3.375 g, intravenous, q6h  sennosides-docusate sodium, 1 tablet, oral, BID  vancomycin, 1,500 mg, intravenous, q24h  [2] PRN medications: dextrose, dextrose, glucagon, glucagon, hydrALAZINE, HYDROcodone-acetaminophen, melatonin, ondansetron, [Transfer Hold] prochlorperazine, vancomycin

## 2025-07-16 NOTE — DISCHARGE INSTR - ACTIVITY
Activity With Spine Precautions  -Activity with assistance.  -Progressively walk 2 times a day.   -Weight bearing as tolerated.  -No pushing, pulling, or lifting objects greater than 10 pounds until follow up appointment.  -No excessive bending, twisting, or heavy house or yard work.  -You may shower once there is no drainage from your incision site for 48 hours.  -You may not drive until your follow up appointment, or while taking narcotic medication.  -You may not return to work/school until your follow appointment.

## 2025-07-16 NOTE — PROGRESS NOTES
Vancomycin Dosing by Pharmacy- FOLLOW UP    Kevon Martinez is a 75 y.o. year old male who Pharmacy has been consulted for vancomycin dosing for other surgical site wound. Based on the patient's indication and renal status this patient is being dosed based on a goal AUC of 400-600.     Renal function is currently stable.    Current vancomycin dose: 1500 mg given every 24 hours    Estimated vancomycin AUC on current dose: 438 mg/L.hr     Visit Vitals  /88   Pulse 87   Temp 36.3 °C (97.3 °F)   Resp 17        Lab Results   Component Value Date    CREATININE 1.03 2025    CREATININE 0.94 2025    CREATININE 1.18 2022    CREATININE 0.91 2022    CREATININE 0.92 2021    CREATININE 0.97 2020        Patient weight is as follows:   Vitals:    25   Weight: 79.4 kg (175 lb)       Cultures:  No results found for the encounter in last 14 days.       I/O last 3 completed shifts:  In: 800 (10.1 mL/kg) [I.V.:800 (10.1 mL/kg)]  Out: 1700 (21.4 mL/kg) [Urine:1650 (0.6 mL/kg/hr); Blood:50]  Weight: 79.4 kg   I/O during current shift:  No intake/output data recorded.    Temp (24hrs), Av.4 °C (97.6 °F), Min:36.2 °C (97.2 °F), Max:36.7 °C (98.1 °F)      Assessment/Plan    Within goal AUC range. Continue current vancomycin regimen.    This dosing regimen is predicted by [employer] to result in the following pharmacokinetic parameters:  Loading dose: N/A  Regimen: 1500 mg IV every 24 hours.  Start time: 21:32 on 2025  Exposure target: AUC24 (range) 400-600 mg/L.hr   [id number]: 438 mg/L.hr  AUC24,ss: 505 mg/L.hr  Probability of AUC24 > 400: 82 %  Ctrough,ss: 15.7 mg/L  Probability of Ctrough,ss > 20: 23 %      The next level will be obtained on  at 0500. May be obtained sooner if clinically indicated.   Will continue to monitor renal function daily while on vancomycin and order serum creatinine at least every 48 hours if not already ordered.  Follow for continued vancomycin  needs, clinical response, and signs/symptoms of toxicity.       Rosa Cain, PharmD

## 2025-07-16 NOTE — PROGRESS NOTES
07/16/25 1430   Rapid Rounds   Attendance Provider;Care Transitions   Expected Discharge Disposition Home  (Pending PT/OT evaluations.)   Today we still await: Clinical stability;Consultant recommendations (Comment)  (Pending ortho final recommendations.)   Review at Escalation Rounds No escalation needed     Roya Cui MSN, RN-BC  Transitional Care Coordinator (TCC)  [phone]

## 2025-07-16 NOTE — PROGRESS NOTES
"Orthopaedic Surgery Progress Note    Subjective:  No acute events overnight. Having some back pain but overall well controlled. Denies chest pain, shortness of breath, or fevers.    Objective:  /88   Pulse 87   Temp 36.3 °C (97.3 °F)   Resp 17   Ht 1.753 m (5' 9\")   Wt 79.4 kg (175 lb)   SpO2 96%   BMI 25.84 kg/m²     Gen: arousable, NAD, appropriately conversational  Cardiac: RRR to peripheral palpation  Resp: nonlabored on RA  GI: soft, nondistended    MSK:  Drain in place holding suction, serosanginous output     C5: SILT   Deltoid 5/5 Left; 5/5 Right  C6: SILT   Wrist Ext: 5/5 Left; 5/5 Right  C7: SILT   Triceps: 5/5 Left; 5/5 Right  C8: SILT   Finger flexion: 5/5 Left; 5/5 Right  T1: SILT    Interossei: 5/5 Left; 5/5 Right     [place]: Negative     L1: SILT       L2: SILT      Hip flexors 5/5 Left; 5/5 Right  L3: SILT      Knee extension 5/5 Left; 5/5 Right  L4: SILT      Tib Ant. (Dorsiflexion) 5/5 Left; 5/5 Right  L5: SILT      EHL 5/5 Left; 5/5 Right  S1: SILT      Plantarflexion 5/5 Left; 5/5 Right     No clonus    Results for orders placed or performed during the hospital encounter of 07/11/25 (from the past 24 hours)   POCT GLUCOSE   Result Value Ref Range    POCT Glucose 155 (H) 74 - 99 mg/dL   POCT GLUCOSE   Result Value Ref Range    POCT Glucose 160 (H) 74 - 99 mg/dL   POCT GLUCOSE   Result Value Ref Range    POCT Glucose 173 (H) 74 - 99 mg/dL   POCT GLUCOSE   Result Value Ref Range    POCT Glucose 244 (H) 74 - 99 mg/dL   Vancomycin   Result Value Ref Range    Vancomycin 13.1 5.0 - 20.0 ug/mL       FL fluoro images no charge   Final Result      CT thoracic spine wo IV contrast   Final Result   Addendum (preliminary) 1 of 1   Interpreted By:  Akua Blank,    ADDENDUM:   Additional images were obtained to include the patient's entire   stimulator device in the left gluteal region. No surrounding   inflammatory changes or fluid collections are seen.        Signed by: Akua Blank 7/12/2025 10:34 AM "        -------- ORIGINAL REPORT --------   Dictation workstation:   [id number]      Final   1. Suboptimal evaluation of the spinal stimulator device which is   partially outside the field of view and further limited by beam   hardening artifact. Within this limitation no surrounding   inflammatory changes or fluid collections.   2. Postsurgical changes of instrumented fusion of the L1-L2 with   intervertebral spacer without evidence of hardware complication.   3. 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        I personally reviewed the images/study and I agree with the findings   as stated by resident physician Danis Posada MD. This study was   interpreted at [facility],   [place], [place].        MACRO:   None        Signed by: Akua Blank 7/12/2025 9:23 AM   Dictation workstation:   [id number]      CT lumbar spine wo IV contrast   Final Result   Addendum (preliminary) 1 of 1   Interpreted By:  Akua Blank,    ADDENDUM:   Additional images were obtained to include the patient's entire   stimulator device in the left gluteal region. No surrounding   inflammatory changes or fluid collections are seen.        Signed by: Akua Blank 7/12/2025 10:34 AM        -------- ORIGINAL REPORT --------   Dictation workstation:   [id number]      Final   1. Suboptimal evaluation of the spinal stimulator device which is   partially outside the field of view and further limited by beam   hardening artifact. Within this limitation no surrounding   inflammatory changes or fluid collections.   2. Postsurgical changes of instrumented fusion of the L1-L2 with   intervertebral spacer without evidence of hardware complication.   3. 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        I personally reviewed the images/study and I agree with the findings   as stated by resident physician Danis Posada MD. This study was   interpreted at [facility]  [place], [place].        MACRO:   None        Signed by: Akua Blank 7/12/2025 9:23 AM   Dictation workstation:   [id number]      XR thoracic spine 3 views   Final Result   Spinal stimulator leads project over the spinal canal within the   midthoracic spine. Visualized portions appear intact. Otherwise no   acute fracture or traumatic malalignment of the thoracic spine.        I personally reviewed the images/study and I agree with the findings   as stated by resident physician Danis Posada MD. This study was   interpreted at [place], [place].        MACRO:   None        Signed by: Anthony García 7/11/2025 9:48 PM   Dictation workstation:   [id number]      XR chest 1 view   Final Result   1.  No evidence of acute cardiopulmonary process.                  MACRO:   None        Signed by: Buster Neely 7/11/2025 6:43 PM   Dictation workstation:   [id number]      XR pelvis 1-2 views   Final Result   No acute osseous abnormality seen             MACRO:   None        Signed by: Buster Neely 7/11/2025 6:42 PM   Dictation workstation:   [id number]      XR lumbar spine 2-3 views   Final Result   Posterior fusion L1-L2 without hardware failure   Multilevel retrolisthesis in the mid to lower lumbar spine. Severe   facet disease and moderate spondylosis in the lower lumbar spine             MACRO:   None        Signed by: Buster Neely 7/11/2025 6:39 PM   Dictation workstation:   [id number]          Assessment/Plan: 75 y.o. male s/p SCS removal, T6-8 laminectomy on 7/15 with Dr. Fountain.       Plan:  - Weight bearing: Mobilize ad annia  - DVT ppx: SCDs, okay for dvt ppx 72 hours postop (7/18)  - Antibiotics: IV vanc and Zosyn while in house. Please dc on PO abx regimen  - Dressing: Mepilex  - PT/OT  - No kwong  - Drain: x1 HV - please record accurate outputs q8h     Dispo: per primary, pending drain pull     Darian Leal MD  Orthopaedic Surgery  PGY-2  [facility]  Available by Epic Chat     While inpatient, this patient will be followed by the Orthopedic Spine team. See below for contact information.      This patient will be followed by the Orthopaedic Spine service. Please page or Epic Chat the corresponding residents below with questions or concerns.      First call: Darina Leal, PGY2  Second call: Jody Pool, PGY4

## 2025-07-16 NOTE — DISCHARGE INSTR - AVS FIRST PAGE
Potential Spine Surgery Complications  -You are breathing faster than normal.  -Fever of 100.4 F or higher.  -Chills  -Urinating less than 4 times per day.  -Acting very sleepy and difficult to awaken.  -Vomiting and not able to eat or drink for 12 hours  -3 or more loose, watery bowel movements in 24 hours (Diarrhea)  -Concerns over the appearance of your incision.    Return immediately to the ER:  -Persistent bilateral leg pain and or numbness >24 hours.  -Perineal numbness/sensory loss  -Urinary retention >12 hours  -Loss of bowel/ bladder control

## 2025-07-16 NOTE — DISCHARGE INSTRUCTIONS
**Please call Cassie Mercedes RN (at [phone]) for any          Postoperative spine questions or concerns.

## 2025-07-17 ENCOUNTER — PHARMACY VISIT (OUTPATIENT)
Dept: PHARMACY | Facility: CLINIC | Age: 75
End: 2025-07-17
Payer: COMMERCIAL

## 2025-07-17 VITALS
SYSTOLIC BLOOD PRESSURE: 121 MMHG | OXYGEN SATURATION: 96 % | WEIGHT: 175 LBS | HEIGHT: 69 IN | DIASTOLIC BLOOD PRESSURE: 62 MMHG | BODY MASS INDEX: 25.92 KG/M2 | RESPIRATION RATE: 17 BRPM | TEMPERATURE: 97.7 F | HEART RATE: 64 BPM

## 2025-07-17 LAB
BASOPHILS # BLD AUTO: 0.11 X10*3/UL (ref 0–0.1)
BASOPHILS NFR BLD AUTO: 0.9 %
EOSINOPHIL # BLD AUTO: 0.09 X10*3/UL (ref 0–0.4)
EOSINOPHIL NFR BLD AUTO: 0.7 %
ERYTHROCYTE [DISTWIDTH] IN BLOOD BY AUTOMATED COUNT: 12.2 % (ref 11.5–14.5)
GLUCOSE BLD MANUAL STRIP-MCNC: 186 MG/DL (ref 74–99)
GLUCOSE BLD MANUAL STRIP-MCNC: 222 MG/DL (ref 74–99)
HCT VFR BLD AUTO: 40.4 % (ref 41–52)
HGB BLD-MCNC: 13.6 G/DL (ref 13.5–17.5)
IMM GRANULOCYTES # BLD AUTO: 0.07 X10*3/UL (ref 0–0.5)
IMM GRANULOCYTES NFR BLD AUTO: 0.6 % (ref 0–0.9)
LYMPHOCYTES # BLD AUTO: 2 X10*3/UL (ref 0.8–3)
LYMPHOCYTES NFR BLD AUTO: 16 %
MCH RBC QN AUTO: 30.6 PG (ref 26–34)
MCHC RBC AUTO-ENTMCNC: 33.7 G/DL (ref 32–36)
MCV RBC AUTO: 91 FL (ref 80–100)
MONOCYTES # BLD AUTO: 1.68 X10*3/UL (ref 0.05–0.8)
MONOCYTES NFR BLD AUTO: 13.5 %
NEUTROPHILS # BLD AUTO: 8.53 X10*3/UL (ref 1.6–5.5)
NEUTROPHILS NFR BLD AUTO: 68.3 %
NRBC BLD-RTO: 0 /100 WBCS (ref 0–0)
PLATELET # BLD AUTO: 203 X10*3/UL (ref 150–450)
RBC # BLD AUTO: 4.44 X10*6/UL (ref 4.5–5.9)
WBC # BLD AUTO: 12.5 X10*3/UL (ref 4.4–11.3)

## 2025-07-17 PROCEDURE — 99232 SBSQ HOSP IP/OBS MODERATE 35: CPT

## 2025-07-17 PROCEDURE — 36415 COLL VENOUS BLD VENIPUNCTURE: CPT

## 2025-07-17 PROCEDURE — 82947 ASSAY GLUCOSE BLOOD QUANT: CPT

## 2025-07-17 PROCEDURE — 97161 PT EVAL LOW COMPLEX 20 MIN: CPT | Mod: GP

## 2025-07-17 PROCEDURE — 2500000001 HC RX 250 WO HCPCS SELF ADMINISTERED DRUGS (ALT 637 FOR MEDICARE OP)

## 2025-07-17 PROCEDURE — 2500000001 HC RX 250 WO HCPCS SELF ADMINISTERED DRUGS (ALT 637 FOR MEDICARE OP): Performed by: STUDENT IN AN ORGANIZED HEALTH CARE EDUCATION/TRAINING PROGRAM

## 2025-07-17 PROCEDURE — 2500000002 HC RX 250 W HCPCS SELF ADMINISTERED DRUGS (ALT 637 FOR MEDICARE OP, ALT 636 FOR OP/ED): Performed by: STUDENT IN AN ORGANIZED HEALTH CARE EDUCATION/TRAINING PROGRAM

## 2025-07-17 PROCEDURE — 2500000004 HC RX 250 GENERAL PHARMACY W/ HCPCS (ALT 636 FOR OP/ED)

## 2025-07-17 PROCEDURE — RXMED WILLOW AMBULATORY MEDICATION CHARGE

## 2025-07-17 PROCEDURE — 85025 COMPLETE CBC W/AUTO DIFF WBC: CPT

## 2025-07-17 RX ORDER — AMOXICILLIN AND CLAVULANATE POTASSIUM 875; 125 MG/1; MG/1
1 TABLET, FILM COATED ORAL 2 TIMES DAILY
Qty: 28 TABLET | Refills: 0 | Status: SHIPPED | OUTPATIENT
Start: 2025-07-17 | End: 2025-07-31

## 2025-07-17 RX ORDER — HYDROCODONE BITARTRATE AND ACETAMINOPHEN 10; 325 MG/1; MG/1
1 TABLET ORAL EVERY 6 HOURS PRN
Qty: 20 TABLET | Refills: 0 | Status: CANCELLED | OUTPATIENT
Start: 2025-07-17

## 2025-07-17 RX ORDER — HYDROCODONE BITARTRATE AND ACETAMINOPHEN 10; 325 MG/1; MG/1
1 TABLET ORAL EVERY 6 HOURS PRN
Qty: 20 TABLET | Refills: 0 | Status: SHIPPED | OUTPATIENT
Start: 2025-07-17 | End: 2025-07-22

## 2025-07-17 RX ORDER — NALOXONE HYDROCHLORIDE 4 MG/.1ML
4 SPRAY NASAL AS NEEDED
Qty: 2 EACH | Refills: 2 | Status: SHIPPED | OUTPATIENT
Start: 2025-07-17

## 2025-07-17 RX ORDER — ONDANSETRON 4 MG/1
4 TABLET, ORALLY DISINTEGRATING ORAL EVERY 8 HOURS PRN
Qty: 20 TABLET | Refills: 0 | Status: SHIPPED | OUTPATIENT
Start: 2025-07-17

## 2025-07-17 RX ORDER — DOXYCYCLINE 100 MG/1
100 CAPSULE ORAL 2 TIMES DAILY
Qty: 28 CAPSULE | Refills: 0 | Status: SHIPPED | OUTPATIENT
Start: 2025-07-17 | End: 2025-07-31

## 2025-07-17 RX ADMIN — SENNOSIDES AND DOCUSATE SODIUM 1 TABLET: 8.6; 5 TABLET ORAL at 08:50

## 2025-07-17 RX ADMIN — OXYCODONE HYDROCHLORIDE 5 MG: 5 TABLET ORAL at 12:05

## 2025-07-17 RX ADMIN — HYDROMORPHONE HYDROCHLORIDE 0.4 MG: 1 INJECTION, SOLUTION INTRAMUSCULAR; INTRAVENOUS; SUBCUTANEOUS at 08:52

## 2025-07-17 RX ADMIN — PIPERACILLIN SODIUM AND TAZOBACTAM SODIUM 3.38 G: 3; .375 INJECTION, SOLUTION INTRAVENOUS at 03:42

## 2025-07-17 RX ADMIN — INSULIN LISPRO 4 UNITS: 100 INJECTION, SOLUTION INTRAVENOUS; SUBCUTANEOUS at 12:04

## 2025-07-17 RX ADMIN — INSULIN LISPRO 2 UNITS: 100 INJECTION, SOLUTION INTRAVENOUS; SUBCUTANEOUS at 08:50

## 2025-07-17 RX ADMIN — OXYCODONE HYDROCHLORIDE 5 MG: 5 TABLET ORAL at 03:42

## 2025-07-17 RX ADMIN — PIPERACILLIN SODIUM AND TAZOBACTAM SODIUM 3.38 G: 3; .375 INJECTION, SOLUTION INTRAVENOUS at 08:51

## 2025-07-17 RX ADMIN — ONDANSETRON 4 MG: 2 INJECTION INTRAMUSCULAR; INTRAVENOUS at 00:04

## 2025-07-17 RX ADMIN — ONDANSETRON 4 MG: 2 INJECTION INTRAMUSCULAR; INTRAVENOUS at 08:51

## 2025-07-17 RX ADMIN — INSULIN GLARGINE 10 UNITS: 100 INJECTION, SOLUTION SUBCUTANEOUS at 08:50

## 2025-07-17 ASSESSMENT — COGNITIVE AND FUNCTIONAL STATUS - GENERAL
MOBILITY SCORE: 24
DAILY ACTIVITIY SCORE: 21
WALKING IN HOSPITAL ROOM: A LITTLE
DRESSING REGULAR UPPER BODY CLOTHING: A LITTLE
CLIMB 3 TO 5 STEPS WITH RAILING: A LITTLE
MOBILITY SCORE: 22
HELP NEEDED FOR BATHING: A LITTLE
DRESSING REGULAR LOWER BODY CLOTHING: A LITTLE

## 2025-07-17 ASSESSMENT — PAIN - FUNCTIONAL ASSESSMENT
PAIN_FUNCTIONAL_ASSESSMENT: 0-10

## 2025-07-17 ASSESSMENT — PAIN DESCRIPTION - LOCATION
LOCATION: BACK

## 2025-07-17 ASSESSMENT — PAIN SCALES - GENERAL
PAINLEVEL_OUTOF10: 8
PAINLEVEL_OUTOF10: 6
PAINLEVEL_OUTOF10: 6
PAINLEVEL_OUTOF10: 5 - MODERATE PAIN
PAINLEVEL_OUTOF10: 9
PAINLEVEL_OUTOF10: 8

## 2025-07-17 ASSESSMENT — PAIN DESCRIPTION - DESCRIPTORS: DESCRIPTORS: ACHING

## 2025-07-17 ASSESSMENT — PAIN DESCRIPTION - ORIENTATION: ORIENTATION: MID

## 2025-07-17 ASSESSMENT — ACTIVITIES OF DAILY LIVING (ADL): ADL_ASSISTANCE: INDEPENDENT

## 2025-07-17 NOTE — PROGRESS NOTES
Physical Therapy    Physical Therapy Evaluation    Patient Name: Kevon Martinez  MRN: [id number]  Department: [place] [address]  Room: [address] [place] [address]  Today's Date: 7/17/2025   Time Calculation  Start Time: 0945  Stop Time: 0956  Time Calculation (min): 11 min    Assessment/Plan   PT Assessment  PT Assessment Results: Decreased mobility  Rehab Prognosis:  (No current/acute PT needs.)  Barriers to Discharge Home: No anticipated barriers  Evaluation/Treatment Tolerance: Patient tolerated treatment well  Medical Staff Made Aware: Yes  End of Session Communication: Care Coordinator  Assessment Comment: 75 y.o. M s/p removal of nerve stimulator in spine currently demonstrating grossly independent gait and function. Pt without acute PT needs at this time nor PT needs after discharge.  End of Session Patient Position: Bed, 3 rail up, Alarm off, not on at start of session  IP OR SWING BED PT PLAN  Inpatient or Swing Bed: Inpatient  PT Plan  Treatment/Interventions:  (No planned interventions.)  PT Plan: PT Eval only  PT Eval Only Reason: No acute PT needs identified  PT Frequency: PT eval only  PT Discharge Recommendations: No PT needed after discharge, No further acute PT  Equipment Recommended upon Discharge: Other (comment) (Pt already owns equipment.)  PT Recommended Transfer Status: Stand by assist, Independent  PT - OK to Discharge: Yes    Subjective     PT Visit Info:  PT Received On: 07/17/25  General Visit Information:  General  Reason for Referral: nerve stimulator complaints  Past Medical History Relevant to Rehab: 75 y.o. male s/p SCS removal, T6-8 laminectomy on 7/15 with Dr. Fountain  Family/Caregiver Present: No  Patient Position Received: Bed, 3 rail up, Alarm off, not on at start of session  Preferred Learning Style: verbal, auditory  General Comment: Pt resting in bed upon entry. Pleasant and cooperative. Willing to work with PT. Hopeful for DC home today pending blood work.  Home Living:  Home Living  Type of Home:  House  Lives With: Spouse  Home Adaptive Equipment: Cane, Walker rolling or standard  Home Layout: One level  Home Access: No concerns (Ranch style home.)  Prior Level of Function:  Prior Function Per Pt/Caregiver Report  Level of [place]: Independent with ADLs and functional transfers  ADL Assistance: Independent  Homemaking Assistance: Independent  Ambulatory Assistance: Independent  Prior Function Comments: No concerns with mobility at baseline. Wife to assist as needed.  Precautions:  Precautions  Medical Precautions: Fall precautions  Precautions Comment: Up ad annia per MD notes     Objective   Pain:  Pain Assessment  Pain Assessment: 0-10  0-10 (Numeric) Pain Score: 6  Pain Interventions:  (Pt reports improvement in pain control since overnight.)  Cognition:  Cognition  Overall Cognitive Status: Within Functional Limits  Orientation Level: Oriented X4  Attention: Within Functional Limits  Insight: Within function limits  Impulsive: Within functional limits  Processing Speed: Within funtional limits    General Assessments:     Activity Tolerance  Endurance: Decreased tolerance for upright activites    Sensation  Light Touch: No apparent deficits    Strength  Strength Comments: BLE grossly >4+/5 throughout.  Strength  Strength Comments: BLE grossly >4+/5 throughout.    Perception  Inattention/Neglect: Appears intact    Coordination  Movements are Fluid and Coordinated: Yes    Postural Control  Postural Control: Impaired  Posture Comment: slightly flexed due to soreness    Static Sitting Balance  Static Sitting-Balance Support: Feet supported  Static Sitting-Level of Assistance: Close supervision    Static Standing Balance  Static Standing-Balance Support: Left upper extremity supported  Static Standing-Level of Assistance: Close supervision  Functional Assessments:       Bed Mobility  Bed Mobility: Yes  Bed Mobility 1  Bed Mobility 1: Supine to sitting, Sitting to supine  Level of Assistance 1: Close  supervision    Transfers  Transfer: Yes  Transfer 1  Transfer From 1: Sit to, Stand to  Transfer to 1: Stand, Sit  Transfer Level of Assistance 1: Independent    Ambulation/Gait Training  Ambulation/Gait Training Performed: Yes  Ambulation/Gait Training 1  Surface 1: Level tile  Device 1: Single point cane  Assistance 1: Close supervision  Quality of Gait 1:  (Slow yet steady gait. No acute LOB. Mild complaints of dizziness from pain medications though not impeding function.)  Comments/Distance (ft) 1: 100ft    Outcome Measures:  [facility] Basic Mobility  Turning from your back to your side while in a flat bed without using bedrails: None  Moving from lying on your back to sitting on the side of a flat bed without using bedrails: None  Moving to and from bed to chair (including a wheelchair): None  Standing up from a chair using your arms (e.g. wheelchair or bedside chair): None  To walk in hospital room: None  Climbing 3-5 steps with railing: None  Basic Mobility - Total Score: 24        Education Documentation  Precautions, taught by Mynor Pizarro PT at 7/17/2025 10:18 AM.  Learner: Patient  Readiness: Acceptance  Method: Explanation  Response: Verbalizes Understanding  Comment: Up with assist while in house. No PT needs after discharge.    Mobility Training, taught by Mynor Pizarro PT at 7/17/2025 10:18 AM.  Learner: Patient  Readiness: Acceptance  Method: Explanation  Response: Verbalizes Understanding  Comment: Up with assist while in house. No PT needs after discharge.    Education Comments  No comments found.

## 2025-07-17 NOTE — CARE PLAN
The patient's goals for the shift include      The clinical goals for the shift include Pt will remain safe and HDS      Problem: Pain - Adult  Goal: Verbalizes/displays adequate comfort level or baseline comfort level  Outcome: Progressing     Problem: Safety - Adult  Goal: Free from fall injury  Outcome: Progressing     Problem: Discharge Planning  Goal: Discharge to home or other facility with appropriate resources  Outcome: Progressing     Problem: Chronic Conditions and Co-morbidities  Goal: Patient's chronic conditions and co-morbidity symptoms are monitored and maintained or improved  Outcome: Progressing     Problem: Nutrition  Goal: Nutrient intake appropriate for maintaining nutritional needs  Outcome: Progressing     Problem: Fall/Injury  Goal: Not fall by end of shift  Outcome: Progressing  Goal: Be free from injury by end of the shift  Outcome: Progressing  Goal: Verbalize understanding of personal risk factors for fall in the hospital  Outcome: Progressing  Goal: Verbalize understanding of risk factor reduction measures to prevent injury from fall in the home  Outcome: Progressing  Goal: Use assistive devices by end of the shift  Outcome: Progressing  Goal: Pace activities to prevent fatigue by end of the shift  Outcome: Progressing     Problem: Pain  Goal: Takes deep breaths with improved pain control throughout the shift  Outcome: Progressing  Goal: Turns in bed with improved pain control throughout the shift  Outcome: Progressing  Goal: Walks with improved pain control throughout the shift  Outcome: Progressing  Goal: Performs ADL's with improved pain control throughout shift  Outcome: Progressing  Goal: Participates in PT with improved pain control throughout the shift  Outcome: Progressing  Goal: Free from opioid side effects throughout the shift  Outcome: Progressing  Goal: Free from acute confusion related to pain meds throughout the shift  Outcome: Progressing     Problem: Respiratory  Goal:  Clear secretions with interventions this shift  Outcome: Progressing  Goal: Minimize anxiety/maximize coping throughout shift  Outcome: Progressing  Goal: Minimal/no exertional discomfort or dyspnea this shift  Outcome: Progressing  Goal: No signs of respiratory distress (eg. Use of accessory muscles. Peds grunting)  Outcome: Progressing  Goal: Patent airway maintained this shift  Outcome: Progressing  Goal: Tolerate mechanical ventilation evidenced by VS/agitation level this shift  Outcome: Progressing  Goal: Tolerate pulmonary toileting this shift  Outcome: Progressing  Goal: Verbalize decreased shortness of breath this shift  Outcome: Progressing  Goal: Wean oxygen to maintain O2 saturation per order/standard this shift  Outcome: Progressing  Goal: Increase self care and/or family involvement in next 24 hours  Outcome: Progressing

## 2025-07-17 NOTE — CARE PLAN
Problem: Pain - Adult  Goal: Verbalizes/displays adequate comfort level or baseline comfort level  Outcome: Progressing     Problem: Safety - Adult  Goal: Free from fall injury  Outcome: Progressing     Problem: Discharge Planning  Goal: Discharge to home or other facility with appropriate resources  Outcome: Progressing     Problem: Chronic Conditions and Co-morbidities  Goal: Patient's chronic conditions and co-morbidity symptoms are monitored and maintained or improved  Outcome: Progressing     Problem: Nutrition  Goal: Nutrient intake appropriate for maintaining nutritional needs  Outcome: Progressing     Problem: Fall/Injury  Goal: Not fall by end of shift  Outcome: Progressing  Goal: Be free from injury by end of the shift  Outcome: Progressing  Goal: Verbalize understanding of personal risk factors for fall in the hospital  Outcome: Progressing  Goal: Verbalize understanding of risk factor reduction measures to prevent injury from fall in the home  Outcome: Progressing  Goal: Use assistive devices by end of the shift  Outcome: Progressing  Goal: Pace activities to prevent fatigue by end of the shift  Outcome: Progressing     Problem: Pain  Goal: Takes deep breaths with improved pain control throughout the shift  Outcome: Progressing  Goal: Turns in bed with improved pain control throughout the shift  Outcome: Progressing  Goal: Walks with improved pain control throughout the shift  Outcome: Progressing  Goal: Performs ADL's with improved pain control throughout shift  Outcome: Progressing  Goal: Participates in PT with improved pain control throughout the shift  Outcome: Progressing  Goal: Free from opioid side effects throughout the shift  Outcome: Progressing  Goal: Free from acute confusion related to pain meds throughout the shift  Outcome: Progressing     Problem: Respiratory  Goal: Clear secretions with interventions this shift  Outcome: Progressing  Goal: Minimize anxiety/maximize coping throughout  shift  Outcome: Progressing  Goal: Minimal/no exertional discomfort or dyspnea this shift  Outcome: Progressing  Goal: No signs of respiratory distress (eg. Use of accessory muscles. Peds grunting)  Outcome: Progressing  Goal: Patent airway maintained this shift  Outcome: Progressing  Goal: Tolerate mechanical ventilation evidenced by VS/agitation level this shift  Outcome: Progressing  Goal: Tolerate pulmonary toileting this shift  Outcome: Progressing  Goal: Verbalize decreased shortness of breath this shift  Outcome: Progressing  Goal: Wean oxygen to maintain O2 saturation per order/standard this shift  Outcome: Progressing  Goal: Increase self care and/or family involvement in next 24 hours  Outcome: Progressing   The patient's goals for the shift include      The clinical goals for the shift include pt to remain HDS

## 2025-07-17 NOTE — PROGRESS NOTES
"Orthopaedic Surgery Progress Note    Subjective:  No acute events overnight. Having some mild back pain but overall well controlled on pain medication. Looking forward to going home. Denies chest pain, shortness of breath, or fevers. No trouble going to the bathroom. Drain pulled this AM.    Objective:  /68 (BP Location: Right arm, Patient Position: Lying)   Pulse 79   Temp 36.5 °C (97.7 °F) (Temporal)   Resp 18   Ht 1.753 m (5' 9\")   Wt 79.4 kg (175 lb)   SpO2 98%   BMI 25.84 kg/m²     Gen: arousable, NAD, appropriately conversational  Cardiac: RRR to peripheral palpation  Resp: nonlabored on RA  GI: soft, nondistended    MSK:  Drain in place holding suction, serosanginous output (pulled this AM 7/17)     C5: SILT   Deltoid 5/5 Left; 5/5 Right  C6: SILT   Wrist Ext: 5/5 Left; 5/5 Right  C7: SILT   Triceps: 5/5 Left; 5/5 Right  C8: SILT   Finger flexion: 5/5 Left; 5/5 Right  T1: SILT    Interossei: 5/5 Left; 5/5 Right     Lezama: Negative     L1: SILT       L2: SILT      Hip flexors 5/5 Left; 5/5 Right  L3: SILT      Knee extension 5/5 Left; 5/5 Right  L4: SILT      Tib Ant. (Dorsiflexion) 5/5 Left; 5/5 Right  L5: SILT      EHL 5/5 Left; 5/5 Right  S1: SILT      Plantarflexion 5/5 Left; 5/5 Right     No clonus    Results for orders placed or performed during the hospital encounter of 07/11/25 (from the past 24 hours)   POCT GLUCOSE   Result Value Ref Range    POCT Glucose 225 (H) 74 - 99 mg/dL   Renal function panel   Result Value Ref Range    Glucose 204 (H) 74 - 99 mg/dL    Sodium 131 (L) 136 - 145 mmol/L    Potassium 4.0 3.5 - 5.3 mmol/L    Chloride 97 (L) 98 - 107 mmol/L    Bicarbonate 20 (L) 21 - 32 mmol/L    Anion Gap 18 10 - 20 mmol/L    Urea Nitrogen 18 6 - 23 mg/dL    Creatinine 1.11 0.50 - 1.30 mg/dL    eGFR 69 >60 mL/min/1.73m*2    Calcium 9.9 8.6 - 10.6 mg/dL    Phosphorus 3.6 2.5 - 4.9 mg/dL    Albumin 4.6 3.4 - 5.0 g/dL   CBC and Auto Differential   Result Value Ref Range    WBC 18.5 (H) " 4.4 - 11.3 x10*3/uL    nRBC 0.0 0.0 - 0.0 /100 WBCs    RBC 4.88 4.50 - 5.90 x10*6/uL    Hemoglobin 14.8 13.5 - 17.5 g/dL    Hematocrit 44.2 41.0 - 52.0 %    MCV 91 80 - 100 fL    MCH 30.3 26.0 - 34.0 pg    MCHC 33.5 32.0 - 36.0 g/dL    RDW 12.1 11.5 - 14.5 %    Platelets 245 150 - 450 x10*3/uL    Neutrophils % 79.7 40.0 - 80.0 %    Immature Granulocytes %, Automated 0.6 0.0 - 0.9 %    Lymphocytes % 7.6 13.0 - 44.0 %    Monocytes % 11.8 2.0 - 10.0 %    Eosinophils % 0.0 0.0 - 6.0 %    Basophils % 0.3 0.0 - 2.0 %    Neutrophils Absolute 14.73 (H) 1.60 - 5.50 x10*3/uL    Immature Granulocytes Absolute, Automated 0.11 0.00 - 0.50 x10*3/uL    Lymphocytes Absolute 1.40 0.80 - 3.00 x10*3/uL    Monocytes Absolute 2.17 (H) 0.05 - 0.80 x10*3/uL    Eosinophils Absolute 0.00 0.00 - 0.40 x10*3/uL    Basophils Absolute 0.05 0.00 - 0.10 x10*3/uL   POCT GLUCOSE   Result Value Ref Range    POCT Glucose 211 (H) 74 - 99 mg/dL   POCT GLUCOSE   Result Value Ref Range    POCT Glucose 204 (H) 74 - 99 mg/dL   POCT GLUCOSE   Result Value Ref Range    POCT Glucose 209 (H) 74 - 99 mg/dL       FL fluoro images no charge   Final Result      CT thoracic spine wo IV contrast   Final Result   Addendum (preliminary) 1 of 1   Interpreted By:  Akua Blank,    ADDENDUM:   Additional images were obtained to include the patient's entire   stimulator device in the left gluteal region. No surrounding   inflammatory changes or fluid collections are seen.        Signed by: Akua Blank 7/12/2025 10:34 AM        -------- ORIGINAL REPORT --------   Dictation workstation:   [id number]      Final   1. Suboptimal evaluation of the spinal stimulator device which is   partially outside the field of view and further limited by beam   hardening artifact. Within this limitation no surrounding   inflammatory changes or fluid collections.   2. Postsurgical changes of instrumented fusion of the L1-L2 with   intervertebral spacer without evidence of hardware complication.   3.  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        I personally reviewed the images/study and I agree with the findings   as stated by resident physician Danis Posada MD. This study was   interpreted at [place], [place].        MACRO:   None        Signed by: Akua Blank 7/12/2025 9:23 AM   Dictation workstation:   [id number]      CT lumbar spine wo IV contrast   Final Result   Addendum (preliminary) 1 of 1   Interpreted By:  Akua Blank,    ADDENDUM:   Additional images were obtained to include the patient's entire   stimulator device in the left gluteal region. No surrounding   inflammatory changes or fluid collections are seen.        Signed by: Akua Blank 7/12/2025 10:34 AM        -------- ORIGINAL REPORT --------   Dictation workstation:   [id number]      Final   1. Suboptimal evaluation of the spinal stimulator device which is   partially outside the field of view and further limited by beam   hardening artifact. Within this limitation no surrounding   inflammatory changes or fluid collections.   2. Postsurgical changes of instrumented fusion of the L1-L2 with   intervertebral spacer without evidence of hardware complication.   3. 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        I personally reviewed the images/study and I agree with the findings   as stated by resident physician Danis Posada MD. This study was   interpreted at [place], [place].        MACRO:   None        Signed by: Akua Blank 7/12/2025 9:23 AM   Dictation workstation:   [id number]      XR thoracic spine 3 views   Final Result   Spinal stimulator leads project over the spinal canal within the   midthoracic spine. Visualized portions appear intact. Otherwise no   acute fracture or traumatic malalignment of the thoracic spine.        I personally reviewed the images/study and I agree with the  findings   as stated by resident physician Danis Posada MD. This study was   interpreted at [facility],   [place], [place].        MACRO:   None        Signed by: Anthony Raquel 7/11/2025 9:48 PM   Dictation workstation:   [id number]      XR chest 1 view   Final Result   1.  No evidence of acute cardiopulmonary process.                  MACRO:   None        Signed by: Buster Neely 7/11/2025 6:43 PM   Dictation workstation:   [id number]      XR pelvis 1-2 views   Final Result   No acute osseous abnormality seen             MACRO:   None        Signed by: Buster Neely 7/11/2025 6:42 PM   Dictation workstation:   [id number]      XR lumbar spine 2-3 views   Final Result   Posterior fusion L1-L2 without hardware failure   Multilevel retrolisthesis in the mid to lower lumbar spine. Severe   facet disease and moderate spondylosis in the lower lumbar spine             MACRO:   None        Signed by: Buster Neely 7/11/2025 6:39 PM   Dictation workstation:   [id number]          Assessment/Plan: 75 y.o. male s/p SCS removal, T6-8 laminectomy on 7/15 with Dr. Fountain.       Plan:  - Weight bearing: Mobilize ad annia  - DVT ppx: SCDs, okay for dvt ppx 72 hours postop (7/18)  - Antibiotics: IV vanc and Zosyn while in house. Please dc on PO abx regimen - recommend 14 days doxy/augmentin  - Dressing: Mepilex  - PT/OT  - No kwong  - Drain: x1 HV - pulled this AM 7/17     Dispo: per primary, okay for discharge from ortho perspective    Orthopaedics will follow peripherally while patient is in house.   - Patient should follow up w/ Dr. Fountain in 3 weeks after discharge for post-operative appointment (patient may call [phone] to schedule).     Please page with questions      Darian Leal MD  Orthopaedic Surgery PGY-2  [facility]  Available by Epic Chat     While inpatient, this patient will be followed by the Orthopedic Spine team. See below for contact information.      This  patient will be followed by the Orthopaedic Spine service. Please page or Epic Chat the corresponding residents below with questions or concerns.      First call: Darian Leal, PGY2  Second call: Jody Pool, PGY4

## 2025-07-17 NOTE — NURSING NOTE
Pt DC from unit in acceptable condition no reports of pain or distress noted, verbalize clear understanding of DC instructions.

## 2025-07-17 NOTE — HOSPITAL COURSE
Mr. Martinez is a 75 year old male with a PMH of T2DM (last HgA1c 8.05 7/11/25), OA, IKER, HLD, GERD, prostate cancer, HTN, and sacroiliitis. Pt presented to ED as transfer from OSH for further evaluation of exposed nerve stimulator. Orthospine consulted  recommend OR s/p OR on 7/15 with Dr. Fountain for I&D and wound closure now with drain removed 7/16. Pt to be discharged  on Augmentin and Doxycyline for 14 days (confirmed with pharmacy safety of Augmentin with penicillin allergy and she said it was ok). Also discharging with increased dose of hydrocodone-acetaminophen( q6hr prn) for 3 days and to continue with previous dosing.  Had an elevated wbc this am but pt aseptic , repeating labs, Pt has been on Zosyn and Vanc whilst on admission.. pt was assessed by Pt and ok for home PT for evaluation

## 2025-07-17 NOTE — PROGRESS NOTES
07/17/25 0947   Rapid Rounds   Attendance Provider;Nurse;Care Transitions   Expected Discharge Disposition Home   Today we still await: Other (Comment)   Additional comments: Plan to check a CBC.   Review at Escalation Rounds No escalation needed     Transitional Care Coordinator Progress Note:   Pt will discharge home today pending results of his CBC.  Pt feels he does not need a PT evaluation or home PT.  Pt states he has been ambulating in the room with his cane.  Pt does not have any stairs at home and has a wheeled walker available.  Sent a message to PT to cancel the evaluation.  Meds to beds was requested.  Pt's wife will provide transportation home. No other discharge needs anticipated at this time.      Roya Cui MSN, RN-BC  Transitional Care Coordinator (TCC)  [phone]

## 2025-07-17 NOTE — PROGRESS NOTES
"DISCHARGE MEDICATION REVIEW BY PHARMACY     NAME:  Kevon Martinez   MRN:  [id number]   SERVICE DATE: 7/17/2025   SERVICE TIME:  9:29 AM       The following discharge medications were reviewed by a pharmacist.  The following recommendations were made: Pt with PCN allergy (\"dizzy\"), pt tolerated Zosyn during admission, should tolerate Augmentin.   Dispo: home       Medication List      START taking these medications     amoxicillin-clavulanate 875-125 mg tablet; Commonly known as: Augmentin;   Take 1 tablet by mouth 2 times a day for 14 days.   doxycycline 100 mg capsule; Commonly known as: Vibramycin; Take 1   capsule (100 mg) by mouth 2 times a day for 14 days. Take with at least 8   ounces (large glass) of water, do not lie down for 30 minutes after   ondansetron ODT 4 mg disintegrating tablet; Commonly known as:   Zofran-ODT; Dissolve 1 tablet (4 mg) in the mouth every 8 hours if needed   for nausea or vomiting.     CHANGE how you take these medications     * HYDROcodone-acetaminophen 5-325 mg tablet; Commonly known as: Norco;   What changed: Another medication with the same name was added. Make sure   you understand how and when to take each.   * HYDROcodone-acetaminophen  mg tablet; Commonly known as: Norco;   Take 1 tablet by mouth every 6 hours if needed for severe pain (7 - 10)   for up to 3 days.; What changed: You were already taking a medication with   the same name, and this prescription was added. Make sure you understand   how and when to take each.  * This list has 2 medication(s) that are the same as other medications   prescribed for you. Read the directions carefully, and ask your doctor or   other care provider to review them with you.     CONTINUE taking these medications     BD Ultra-Fine Short Pen Needle 31 gauge x 5/16\" needle; Generic drug:   pen needle, diabetic   Lantus Solostar U-100 Insulin 100 unit/mL (3 mL) pen; Generic drug:   insulin glargine   metFORMIN  mg 24 hr " tablet; Commonly known as: Glucophage-XR   promethazine 25 mg tablet; Commonly known as: Phenergan       Rosa Cain, PharmD, BCPS  Internal Medicine Pharmacist, [facility]

## 2025-07-17 NOTE — DOCUMENTATION CLARIFICATION NOTE
"    PATIENT:               ALONDRA HAMEED  ACCT #:                  [id number]  MRN:                       [id number]  :                       1950  ADMIT DATE:       2025 2:50 PM  DISCH DATE:  RESPONDING PROVIDER #:        [id number]          PROVIDER RESPONSE TEXT:    Excisional debridement down to and including subcutaneous tissue and/or fascia    CDI QUERY TEXT:    Clarification    Instruction:    Based on your assessment of the patient and the clinical information, please provide the requested documentation by clicking on the appropriate radio button and enter any additional information if prompted.    Question: Please further clarify the debridement procedure as    When answering this query, please exercise your independent professional judgment. The fact that a question is being asked, does not imply that any particular answer is desired or expected.    The patient's clinical indicators include:  Clinical Information: This query refers to the procedure performed on 7/15/2025 and documented as: \" 3. Irrigation and debridement of skin, muscle, fascia (wound size approx 8 x 2 cm) \"    7/15 Op note: \" 3 L bag of normal saline with cystoscopy tubing to thoroughly irrigate both the thoracic wound bed in addition to the lower lumbar wound. \"  \" Mechanical debridement was performed with curettage \"  Options provided:  -- Excisional debridement down to and including subcutaneous tissue and/or fascia  -- Non-excisional debridement down to and including subcutaneous tissue and/or fascia  -- Other - I will add my own diagnosis  -- Refer to Clinical Documentation Reviewer    Query created by: Anu Rocha on 2025 5:53 PM      Electronically signed by:  ALLEN MANRIQUE MD 2025 7:55 AM          "

## 2025-07-17 NOTE — DISCHARGE SUMMARY
"Discharge Diagnosis  -Wound dehiscence  -Exposed stimulator    Issues Requiring Follow-Up  -Will fu w ortho 8/1  -PT outpatient    Discharge Meds     Medication List      START taking these medications     amoxicillin-clavulanate 875-125 mg tablet; Commonly known as: Augmentin;   Take 1 tablet by mouth 2 times a day for 14 days.   doxycycline 100 mg capsule; Commonly known as: Vibramycin; Take 1   capsule (100 mg) by mouth 2 times a day for 14 days. Take with at least 8   ounces (large glass) of water, do not lie down for 30 minutes after   ondansetron ODT 4 mg disintegrating tablet; Commonly known as:   Zofran-ODT; Dissolve 1 tablet (4 mg) in the mouth every 8 hours if needed   for nausea or vomiting.     CHANGE how you take these medications     * HYDROcodone-acetaminophen 5-325 mg tablet; Commonly known as: Norco;   What changed: Another medication with the same name was added. Make sure   you understand how and when to take each.   * HYDROcodone-acetaminophen  mg tablet; Commonly known as: Norco;   Take 1 tablet by mouth every 6 hours if needed for severe pain (7 - 10)   for up to 3 days.; What changed: You were already taking a medication with   the same name, and this prescription was added. Make sure you understand   how and when to take each.  * This list has 2 medication(s) that are the same as other medications   prescribed for you. Read the directions carefully, and ask your doctor or   other care provider to review them with you.     CONTINUE taking these medications     BD Ultra-Fine Short Pen Needle 31 gauge x 5/16\" needle; Generic drug:   pen needle, diabetic   Lantus Solostar U-100 Insulin 100 unit/mL (3 mL) pen; Generic drug:   insulin glargine   metFORMIN  mg 24 hr tablet; Commonly known as: Glucophage-XR   promethazine 25 mg tablet; Commonly known as: Phenergan       Test Results Pending At Discharge  Pending Labs       Order Current Status    Surgical Pathology Exam In process      "       Hospital Course  Mr. Martinez is a 75 year old male with a PMH of T2DM (last HgA1c 8.05 7/11/25), OA, IKER, HLD, GERD, prostate cancer, HTN, and sacroiliitis. Pt presented to ED as transfer from OSH for further evaluation of exposed nerve stimulator. Orthospine consulted  recommend OR s/p OR on 7/15 with Dr. Fountain for I&D and wound closure now with drain removed 7/16. Pt to be discharged  on Augmentin and Doxycyline for 14 days (confirmed with pharmacy safety of Augmentin with penicillin allergy also considering Pt has tolerated Zosyn whilst inpatient). Also discharging with increased dose of hydrocodone-acetaminophen( q6hr prn) for 3 days and to continue with previous home dosing and narcan  Had an elevated wbc but pt aseptic repeating labs downtrending (white count 18.5 repeat 12.5) likely reactive in the setting of procedure done.    Patient advised to report to the ED if with fever nausea vomiting or worsening back pain.    Pertinent Physical Exam At Time of Discharge  Physical Exam  GEN: well-appearing, no acute distress  HEENT: PERRLA, EOMI, moist mucous membranes, no scleral icterus  NECK: Supple  CV: RRR, no murmurs/rubs/gallops  PULM: Breathing comfortably, clear to auscultation bilaterally  AB: Soft, non-tender, non-distended, lower back, area looks clean and dry.  : No in-dwelling kwong  MSK: No lower extremity edema bilaterally  VASC: DP/PT pulses palpable bilaterally  NEURO: A&Ox3, following commands, conversational  Outpatient Follow-Up  Future Appointments   Date Time Provider Department Center   8/1/2025  1:45 PM Jefry SALCEDO MD [id number]         Ankur Funes MD

## 2025-07-21 LAB
LABORATORY COMMENT REPORT: NORMAL
PATH REPORT.FINAL DX SPEC: NORMAL
PATH REPORT.GROSS SPEC: NORMAL
PATH REPORT.RELEVANT HX SPEC: NORMAL
PATH REPORT.TOTAL CANCER: NORMAL

## 2025-07-31 DIAGNOSIS — Z96.82 PRESENCE OF NEUROSTIMULATOR: ICD-10-CM

## 2025-08-01 ENCOUNTER — HOSPITAL ENCOUNTER (OUTPATIENT)
Dept: RADIOLOGY | Facility: CLINIC | Age: 75
Discharge: HOME | End: 2025-08-01
Payer: MEDICARE

## 2025-08-01 ENCOUNTER — OFFICE VISIT (OUTPATIENT)
Dept: ORTHOPEDIC SURGERY | Facility: CLINIC | Age: 75
End: 2025-08-01
Payer: MEDICARE

## 2025-08-01 DIAGNOSIS — Z96.82 PRESENCE OF NEUROSTIMULATOR: ICD-10-CM

## 2025-08-01 DIAGNOSIS — T85.192D SPINAL CORD STIMULATOR DYSFUNCTION, SUBSEQUENT ENCOUNTER: ICD-10-CM

## 2025-08-01 DIAGNOSIS — T81.30XA WOUND DEHISCENCE: Primary | ICD-10-CM

## 2025-08-01 DIAGNOSIS — T84.498A EXPOSED ORTHOPAEDIC HARDWARE: ICD-10-CM

## 2025-08-01 PROCEDURE — 99202 OFFICE O/P NEW SF 15 MIN: CPT | Performed by: STUDENT IN AN ORGANIZED HEALTH CARE EDUCATION/TRAINING PROGRAM

## 2025-08-01 PROCEDURE — 1159F MED LIST DOCD IN RCRD: CPT | Performed by: STUDENT IN AN ORGANIZED HEALTH CARE EDUCATION/TRAINING PROGRAM

## 2025-08-01 PROCEDURE — 72100 X-RAY EXAM L-S SPINE 2/3 VWS: CPT

## 2025-08-01 PROCEDURE — 99024 POSTOP FOLLOW-UP VISIT: CPT | Performed by: STUDENT IN AN ORGANIZED HEALTH CARE EDUCATION/TRAINING PROGRAM

## 2025-08-01 PROCEDURE — 72070 X-RAY EXAM THORAC SPINE 2VWS: CPT

## 2025-08-01 PROCEDURE — 1036F TOBACCO NON-USER: CPT | Performed by: STUDENT IN AN ORGANIZED HEALTH CARE EDUCATION/TRAINING PROGRAM

## 2025-08-01 PROCEDURE — 1125F AMNT PAIN NOTED PAIN PRSNT: CPT | Performed by: STUDENT IN AN ORGANIZED HEALTH CARE EDUCATION/TRAINING PROGRAM

## 2025-08-01 PROCEDURE — 1111F DSCHRG MED/CURRENT MED MERGE: CPT | Performed by: STUDENT IN AN ORGANIZED HEALTH CARE EDUCATION/TRAINING PROGRAM

## 2025-08-01 ASSESSMENT — PAIN SCALES - GENERAL: PAINLEVEL_OUTOF10: 7

## 2025-08-01 ASSESSMENT — PAIN - FUNCTIONAL ASSESSMENT: PAIN_FUNCTIONAL_ASSESSMENT: 0-10

## 2025-08-01 NOTE — PROGRESS NOTES
Orthopaedic Spine Surgery Clinic Note    Patient ID: Kevon Martinez is a 75 y.o. male.    3 weeks s/p T7-9 laminectomy and I&D with removal of exposed spinal cord stimulator generator/leads on 7/15/2025. Mr. Martinez presents for his first follow-up appointment.  He is overall doing quite well.  He does report no systemic symptoms including fevers or chills.  He states that his incisions have healed up.  He he does have chronic back pain for which she is followed by pain management, a Dr. Silvestre.  He is on hydrocodone long-term due to this chronic pain.  This was his indication for the spinal cord stimulator that was implanted in the past.  This stimulator was actually working quite well for him, so now he is experiencing some increased low back pain.      Vitals & Measurements  There were no vitals filed for this visit.     Ortho Exam  General: AO x 3, NAD  Cardio: examined extremities perfused by peripheral palpation  Resp: breathing unlabored  Gait: Stooped forward posture, cane assisted ambulation  Posterior thoracic and lumbar incisions well-approximated with nylon sutures without evidence of dehiscence or drainage      Lower Extremity  Right  Left  IP   5/5  5/5  Quadriceps  5/5  5/5  Tibialis anterior  5/5  5/5  EHL   5/5  5/5  Gastrocnemius  5/5  5/5    Sensation: Normal to light touch throughout upper and lower extremities bilaterally.        Diagnostic Results - Imaging    XR thoracic spine today, 8/1/2025  Official read pending.  New upright AP and lateral radiographs of the thoracic spine were obtained in the office today.  These images are of good quality.  Demonstrated on these views are instrumentation changes that are apparent and consistent with an L1-2 posterior spinal decompression and instrumented fusion in addition to an anterior cervical discectomy and fusion.  There is a kyphotic posture that is evident within the mid thoracic spine.  No acute fracture or dislocation.  The prior  laminectomy bed from T7-T9 is apparent on the coronal view.      XR lumbar spine today, 8/1/2025  Official read pending.  New upright AP and lateral radiographs of the lumbar spine were obtained in the office today.  These images are of good quality.  Demonstrate on these views is a degenerative scoliosis that is evident on coronal profile, apex left.  There are instrumentation changes consistent with an L1-2 decompression and instrumented fusion.  There is multilevel spondylosis with disc degeneration and almost complete collapse throughout the lumbar spine.  There is multilevel facet arthropathy throughout the lumbar spine.  No acute fracture or dislocation.        Diagnosis  Encounter Diagnoses   Name Primary?    Wound dehiscence Yes    Exposed orthopaedic hardware     Spinal cord stimulator dysfunction, subsequent encounter           Assessment/Plan  Mr. Martinez is a pleasant 75-year-old male who is s/p T7-9 laminectomy and I&D with removal of exposed spinal cord stimulator generator/leads on 7/15/2025.  Patient is overall doing quite well.  He denies any systemic symptoms including fevers or chills.  His posterior thoracic and lower lumbar incisions are well-healed and approximated with nylon sutures.  These nylon sutures were removed in the office today.  We reviewed his upright XR images today in the office which demonstrates no retained hardware from the spinal cord stimulator.  He does have a degenerative scoliosis and multilevel spondylosis in the setting of a prior L1 to posterior spinal instrumented fusion and decompression.  He has no focal neurologic deficits and is ambulatory with cane assistance.    We discussed taking it easy over the next few weeks.  I recommended no heavy lifting, significant/repetitive/sudden bending or twisting with his back for an additional 3 weeks.  He is no longer taking the doxycycline or Augmentin as he was prescribed on discharge.  He does continue to take Norco which  has been a long-term medication for him provided by his pain management provider, a Dr. Silvestre.  I did advise him that he would need to obtain formal driving clearance from this provider since he is high on long-term opioid medication.  From a surgical standpoint, I do not have any restrictions from a driving standpoint but he will have to obtain formal clearance from this provider for these considerations.    Patient does have a history of chronic pain in the lower back secondary to his degenerative scoliosis and multilevel spondylosis in addition to the prior fusion surgery that he had performed in the remote past.  It seems that the spinal cord stimulator was actually helping him with his pains.  Once he has done recovering from his spinal cord stimulator removal, we can plan to evaluate his  spine with an MRI to evaluate for any focus of nerve stenosis that may be contributing to his symptoms.  We will plan to do this at his 3-month follow-up.    Patient will follow-up with me in approximately 3 weeks for repeat clinical and radiographic evaluation. After our discussion, the patient articulated understanding of the plan and felt that all questions had been answered satisfactorily. The patient was pleased with the visit and very appreciative for the care rendered.    **Please excuse any errors in grammar or translation related to this dictation. Voice recognition software was utilized to prepare this document. **    F/u 3 weeks. AP and lateral thoracic/lumbar XRs.      No orders of the defined types were placed in this encounter.      --    Jefry Fountain MD  Orthopaedic Spine Surgery  , Department of Orthopaedic Surgery  [facility]

## 2025-08-29 ENCOUNTER — HOSPITAL ENCOUNTER (OUTPATIENT)
Dept: RADIOLOGY | Facility: CLINIC | Age: 75
Discharge: HOME | End: 2025-08-29
Payer: MEDICARE

## 2025-08-29 ENCOUNTER — OFFICE VISIT (OUTPATIENT)
Dept: ORTHOPEDIC SURGERY | Facility: CLINIC | Age: 75
End: 2025-08-29
Payer: MEDICARE

## 2025-08-29 DIAGNOSIS — G89.29 CHRONIC BILATERAL LOW BACK PAIN WITHOUT SCIATICA: Primary | ICD-10-CM

## 2025-08-29 DIAGNOSIS — T85.192D SPINAL CORD STIMULATOR DYSFUNCTION, SUBSEQUENT ENCOUNTER: ICD-10-CM

## 2025-08-29 DIAGNOSIS — M54.50 CHRONIC BILATERAL LOW BACK PAIN WITHOUT SCIATICA: Primary | ICD-10-CM

## 2025-08-29 PROCEDURE — 72070 X-RAY EXAM THORAC SPINE 2VWS: CPT

## 2025-08-29 PROCEDURE — 99024 POSTOP FOLLOW-UP VISIT: CPT | Performed by: STUDENT IN AN ORGANIZED HEALTH CARE EDUCATION/TRAINING PROGRAM

## 2025-08-29 PROCEDURE — 1125F AMNT PAIN NOTED PAIN PRSNT: CPT | Performed by: STUDENT IN AN ORGANIZED HEALTH CARE EDUCATION/TRAINING PROGRAM

## 2025-08-29 PROCEDURE — 3052F HG A1C>EQUAL 8.0%<EQUAL 9.0%: CPT | Performed by: STUDENT IN AN ORGANIZED HEALTH CARE EDUCATION/TRAINING PROGRAM

## 2025-08-29 PROCEDURE — 99203 OFFICE O/P NEW LOW 30 MIN: CPT

## 2025-08-29 PROCEDURE — 1159F MED LIST DOCD IN RCRD: CPT | Performed by: STUDENT IN AN ORGANIZED HEALTH CARE EDUCATION/TRAINING PROGRAM

## 2025-08-29 PROCEDURE — 72100 X-RAY EXAM L-S SPINE 2/3 VWS: CPT

## 2025-08-29 ASSESSMENT — PAIN SCALES - GENERAL: PAINLEVEL_OUTOF10: 7

## 2025-08-29 ASSESSMENT — PAIN - FUNCTIONAL ASSESSMENT: PAIN_FUNCTIONAL_ASSESSMENT: 0-10

## (undated) DEVICE — EVACUATOR, WOUND, CLOSED, 3 SPRING, 400 CC, Y CONNECTING TUBE

## (undated) DEVICE — Device

## (undated) DEVICE — ELECTRODE, ELECTROSURGICAL, BLADE, INSULATED, ENT/IMA, STERILE

## (undated) DEVICE — ELECTRODE, CORKSCREW NEEDLE 1.5M LENGTH

## (undated) DEVICE — SUTURE, VICRYL, 2-0, 18 IN, CT-1, UNDYED

## (undated) DEVICE — NEEDLE, ELECTRODE, SUBDERMAL,SINGLE, 200CM LEAD, DISP

## (undated) DEVICE — PREP, IODOPHOR, W/ALCOHOL, DURAPREP, W/APPLICATOR, 26 CC

## (undated) DEVICE — SEALER, BIPOLAR, AQUA MANTYS 6.0

## (undated) DEVICE — DRILL, NEURO, PRECISION, 3MM X 3.8MM, CARBIDE

## (undated) DEVICE — SUTURE, ETHILON, 2-0, FSLX 30, BLACK

## (undated) DEVICE — DRESSING, MEPILEX BORDER, POST-OP AG, 4 X 10 IN

## (undated) DEVICE — DRAIN, WOUND, ROUND, W/TROCAR, HOLE PATTERN, 10 IN, MEDIUM, 1/8 X 49 IN

## (undated) DEVICE — REST, HEAD, BAGEL, 7 IN

## (undated) DEVICE — COLLECTION/DELIVERY SYSTEM, COPAN ESWAB, REG SIZE SWAB

## (undated) DEVICE — NEEDLE, ELECTRODE, SUBDERMAL, PAIRED, 2.0 LEAD, DISP

## (undated) DEVICE — CLIPPER, SURGICAL BLADE ASSEMBLY, GENERAL PURPOSE, SINGLE USE

## (undated) DEVICE — ELECTRODE, GROUND PLATE

## (undated) DEVICE — KIT, PATIENT CARE, JACKSON TABLE W/PRONE-SAFE HEADREST

## (undated) DEVICE — DRESSING, SPONGE, GAUZE, CURITY, 4 X 4 IN, STERILE

## (undated) DEVICE — SEALANT, HEMOSTATIC, FLOSEAL, 10 ML

## (undated) DEVICE — SUTURE, VICRYL PLUS, 0, 8X18IN, CT-1, UND, BRAIDED

## (undated) DEVICE — COVER, TABLE, UHC

## (undated) DEVICE — MANIFOLD, 4 PORT NEPTUNE STANDARD

## (undated) DEVICE — TIP, SUCTION, FRAZIER, W/CONTROL VENT, 12 FR

## (undated) DEVICE — COVER, CART, 45 X 27 X 48 IN, CLEAR